# Patient Record
Sex: FEMALE | Race: WHITE | NOT HISPANIC OR LATINO | Employment: PART TIME | URBAN - METROPOLITAN AREA
[De-identification: names, ages, dates, MRNs, and addresses within clinical notes are randomized per-mention and may not be internally consistent; named-entity substitution may affect disease eponyms.]

---

## 2017-01-04 ENCOUNTER — TELEPHONE (OUTPATIENT)
Dept: FAMILY MEDICINE | Facility: CLINIC | Age: 37
End: 2017-01-04

## 2017-01-04 DIAGNOSIS — G47.00 INSOMNIA, UNSPECIFIED TYPE: Primary | ICD-10-CM

## 2017-01-04 RX ORDER — DOXEPIN HYDROCHLORIDE 10 MG/ML
3 SOLUTION ORAL AT BEDTIME
Qty: 30 ML | Refills: 3 | Status: SHIPPED | OUTPATIENT
Start: 2017-01-04 | End: 2018-05-03

## 2017-01-04 NOTE — TELEPHONE ENCOUNTER
Reason for Call:  Other / started new medication, doxepine, Dr Schumacher had wanted her to call in and give an update as to how the new medication is working    Detailed comments: Patient stated it is working and she would like to get a refill, Dr Schumacher had given patient a 1 month supply    Phone Number Patient can be reached at: Home number on file [phone] (home)    Best Time: anytime    Can we leave a detailed message on this number? YES    Call taken on 1/4/2017 at 10:17 AM by Althea Roche

## 2017-01-30 ENCOUNTER — MYC MEDICAL ADVICE (OUTPATIENT)
Dept: FAMILY MEDICINE | Facility: CLINIC | Age: 37
End: 2017-01-30

## 2017-01-30 NOTE — TELEPHONE ENCOUNTER
Se Tellez.     Ramya Rodriguez RN   January 30, 2017 12:39 PM  [facility]   [phone]

## 2018-01-03 ENCOUNTER — TELEPHONE (OUTPATIENT)
Dept: FAMILY MEDICINE | Facility: CLINIC | Age: 38
End: 2018-01-03

## 2018-01-03 DIAGNOSIS — Z00.00 ROUTINE GENERAL MEDICAL EXAMINATION AT A HEALTH CARE FACILITY: ICD-10-CM

## 2018-01-03 DIAGNOSIS — Z13.220 LIPID SCREENING: Primary | ICD-10-CM

## 2018-01-03 NOTE — TELEPHONE ENCOUNTER
Patient has a physical scheduled with you on 1/22/17.  Per Health Maintenance she is only due for Lipids.  She is requesting Thyroid and Vit. D labs. Her last tests were all normal. She is not taking levothyroxine and has not had a Vit D deficiency.  I ordered the tests because she had these labs done at her last physical also.  Left a message on patient's voicemail asking her to call and schedule a lab only appointment.  Regino Pierce RN

## 2018-01-03 NOTE — TELEPHONE ENCOUNTER
Reason for Call: Request for an order or referral:    Order or referral being requested: Full thyroid panel T3 and T4, Vitamin D, and all labs with annual physical.     Date needed: before my next appointment    Has the patient been seen by the PCP for this problem? Not Applicable    Additional comments: Patient would like to have her lab orders placed for her physical she has scheduled on 01/22/17. Please call back to discuss when labs are placed.     Phone number Patient can be reached at:  Home number on file [phone] (home)    Best Time:  anytime    Can we leave a detailed message on this number?  YES    Call taken on 1/3/2018 at 9:13 AM by Concha Henderson

## 2018-01-17 DIAGNOSIS — Z13.220 LIPID SCREENING: ICD-10-CM

## 2018-01-17 DIAGNOSIS — Z00.00 ROUTINE GENERAL MEDICAL EXAMINATION AT A HEALTH CARE FACILITY: ICD-10-CM

## 2018-01-17 PROCEDURE — 84443 ASSAY THYROID STIM HORMONE: CPT | Performed by: FAMILY MEDICINE

## 2018-01-17 PROCEDURE — 36415 COLL VENOUS BLD VENIPUNCTURE: CPT | Performed by: FAMILY MEDICINE

## 2018-01-17 PROCEDURE — 82306 VITAMIN D 25 HYDROXY: CPT | Performed by: FAMILY MEDICINE

## 2018-01-17 PROCEDURE — 80061 LIPID PANEL: CPT | Performed by: FAMILY MEDICINE

## 2018-01-18 LAB
CHOLEST SERPL-MCNC: 171 MG/DL
DEPRECATED CALCIDIOL+CALCIFEROL SERPL-MC: 28 UG/L (ref 20–75)
HDLC SERPL-MCNC: 47 MG/DL
LDLC SERPL CALC-MCNC: 114 MG/DL
NONHDLC SERPL-MCNC: 124 MG/DL
TRIGL SERPL-MCNC: 49 MG/DL
TSH SERPL DL<=0.005 MIU/L-ACNC: 0.73 MU/L (ref 0.4–4)

## 2018-01-22 ENCOUNTER — OFFICE VISIT (OUTPATIENT)
Dept: FAMILY MEDICINE | Facility: CLINIC | Age: 38
End: 2018-01-22
Payer: COMMERCIAL

## 2018-01-22 VITALS
RESPIRATION RATE: 22 BRPM | BODY MASS INDEX: 29.37 KG/M2 | HEART RATE: 90 BPM | HEIGHT: 64 IN | WEIGHT: 172 LBS | SYSTOLIC BLOOD PRESSURE: 132 MMHG | TEMPERATURE: 98.3 F | OXYGEN SATURATION: 97 % | DIASTOLIC BLOOD PRESSURE: 82 MMHG

## 2018-01-22 DIAGNOSIS — R53.83 OTHER FATIGUE: ICD-10-CM

## 2018-01-22 DIAGNOSIS — F51.04 PSYCHOPHYSIOLOGICAL INSOMNIA: ICD-10-CM

## 2018-01-22 DIAGNOSIS — F41.9 ANXIETY: ICD-10-CM

## 2018-01-22 DIAGNOSIS — Z12.4 CERVICAL CANCER SCREENING: ICD-10-CM

## 2018-01-22 DIAGNOSIS — Z30.41 SURVEILLANCE OF PREVIOUSLY PRESCRIBED CONTRACEPTIVE PILL: ICD-10-CM

## 2018-01-22 DIAGNOSIS — L40.9 PSORIASIS: ICD-10-CM

## 2018-01-22 DIAGNOSIS — Z00.01 ENCOUNTER FOR PREVENTATIVE ADULT HEALTH CARE EXAM WITH ABNORMAL FINDINGS: Primary | ICD-10-CM

## 2018-01-22 PROCEDURE — 99213 OFFICE O/P EST LOW 20 MIN: CPT | Mod: 25 | Performed by: FAMILY MEDICINE

## 2018-01-22 PROCEDURE — 99395 PREV VISIT EST AGE 18-39: CPT | Performed by: FAMILY MEDICINE

## 2018-01-22 PROCEDURE — G0145 SCR C/V CYTO,THINLAYER,RESCR: HCPCS | Performed by: FAMILY MEDICINE

## 2018-01-22 PROCEDURE — 87624 HPV HI-RISK TYP POOLED RSLT: CPT | Performed by: FAMILY MEDICINE

## 2018-01-22 RX ORDER — CLOBETASOL PROPIONATE 0.5 MG/ML
SOLUTION TOPICAL DAILY PRN
Qty: 50 ML | Refills: 3 | Status: SHIPPED | OUTPATIENT
Start: 2018-01-22 | End: 2021-10-25

## 2018-01-22 RX ORDER — ESCITALOPRAM OXALATE 10 MG/1
20 TABLET ORAL DAILY
Qty: 180 TABLET | Refills: 3 | Status: CANCELLED | OUTPATIENT
Start: 2018-01-22

## 2018-01-22 RX ORDER — LEVONORGESTREL AND ETHINYL ESTRADIOL 0.15-0.03
1 KIT ORAL DAILY
Qty: 90 TABLET | Refills: 4 | Status: SHIPPED | OUTPATIENT
Start: 2018-01-22 | End: 2019-03-26

## 2018-01-22 RX ORDER — ESCITALOPRAM OXALATE 20 MG/1
20 TABLET ORAL DAILY
Qty: 90 TABLET | Refills: 3 | Status: SHIPPED | OUTPATIENT
Start: 2018-01-22 | End: 2019-02-05

## 2018-01-22 ASSESSMENT — ANXIETY QUESTIONNAIRES
GAD7 TOTAL SCORE: 5
6. BECOMING EASILY ANNOYED OR IRRITABLE: MORE THAN HALF THE DAYS
1. FEELING NERVOUS, ANXIOUS, OR ON EDGE: MORE THAN HALF THE DAYS
2. NOT BEING ABLE TO STOP OR CONTROL WORRYING: NOT AT ALL
IF YOU CHECKED OFF ANY PROBLEMS ON THIS QUESTIONNAIRE, HOW DIFFICULT HAVE THESE PROBLEMS MADE IT FOR YOU TO DO YOUR WORK, TAKE CARE OF THINGS AT HOME, OR GET ALONG WITH OTHER PEOPLE: SOMEWHAT DIFFICULT
3. WORRYING TOO MUCH ABOUT DIFFERENT THINGS: NOT AT ALL
5. BEING SO RESTLESS THAT IT IS HARD TO SIT STILL: NOT AT ALL
7. FEELING AFRAID AS IF SOMETHING AWFUL MIGHT HAPPEN: NOT AT ALL

## 2018-01-22 ASSESSMENT — PATIENT HEALTH QUESTIONNAIRE - PHQ9
SUM OF ALL RESPONSES TO PHQ QUESTIONS 1-9: 8
5. POOR APPETITE OR OVEREATING: SEVERAL DAYS

## 2018-01-22 ASSESSMENT — PAIN SCALES - GENERAL: PAINLEVEL: NO PAIN (0)

## 2018-01-22 NOTE — MR AVS SNAPSHOT
After Visit Summary   1/22/2018    Josh Wolff    MRN: [id number]           Patient Information     Date Of Birth          1980        Visit Information        Provider Department      1/22/2018 4:20 PM Emili Schumacher MD [facility]        Today's Diagnoses     Encounter for preventative adult health care exam with abnormal findings    -  1    Anxiety        Other fatigue        Cervical cancer screening        Surveillance of previously prescribed contraceptive pill        Psychophysiological insomnia        Psoriasis          Care Instructions    [facility]   Discharged by : Theresa Conte CMA      If you have any questions regarding your visit please contact your care team:     Team Gold                Clinic Hours Telephone Number     Dr. Samantha Rodríguez 7am-7pm  Monday - Thursday   7am-5pm  Fridays  [phone]   (Appointment scheduling available 24/7)     RN Line  [phone] option 2     Urgent Care - [place] - 11am-9pm Monday-Friday Saturday-Sunday- 9am-5pm     [place] -   5pm-9pm Monday-Friday Saturday-Sunday- 9am-5pm    [phone] - [place]    [phone] - [place]       For a Price Quote for your services, please call our Consumer Price Line at [phone].     What options do I have for visits at the clinic other than the traditional office visit?     To expand how we care for you, many of our providers are utilizing electronic visits (e-visits) and telephone visits, when medically appropriate, for interactions with their patients rather than a visit in the clinic. We also offer nurse visits for many medical concerns. Just like any other service, we will bill your insurance company for this type of visit based on time spent on the phone with your provider. Not all insurance companies cover these visits.  Please check with your medical insurance if this type of visit is covered. You will be responsible for any charges that are not paid by your insurance.   E-visits via Meuugamehart: generally incur a $35.00 fee.     Telephone visits:   Time spent on the phone: *charged based on time that is spent on the phone in increments of 10 minutes. Estimated cost:   5-10 mins $30.00   11-20 mins. $59.00   21-30 mins. $85.00     Use Nativoo (secure email communication and access to your chart) to send your primary care provider a message or make an appointment. Ask someone on your Team how to sign up for Nativoo.     As always, Thank you for trusting us with your health care needs!      [facility]:    Scheduling Appointments  [place] [place]  Call: [phone]    Jelena Flower [facility]  Call: [phone]    [facility]  Call: [phone]    For Gastroenterology referrals   [facility]   Clinics and Surgery Center, 4th Floor   [address] [place] [address]   Appointments: [phone]    WHERE TO GO FOR CARE?  Clinic    Make an appointment if you:       Are sick (cold, cough, flu, sore throat, earache or in pain).       Have a small injury (sprain, small cut, burn or broken bone).       Need a physical exam, Pap smear, vaccine or prescription refill.       Have questions about your health or medicines.    To reach us:      Call [phone] ([phone]). Open 24 hours every day. (For counseling services, call [phone].)    Log into Nativoo at [email or web address]. (Visit [email or web address] to create an account.) Hospital emergency room    An emergency is a serious or life- threatening problem that must be treated right away.    Call 532 or get to the hospital if you have:      Very bad or sudden:            - Chest pain or pressure         - Bleeding         - Head or belly pain         - Dizziness or trouble seeing,  walking or                          Speaking      Problems breathing      Blood in your vomit or you are coughing up blood      A major injury (knocked out, loss of a finger or limb, rape, broken bone protruding from skin)    A mental health crisis. (Or call the Mental Health Crisis line at 1-275.762.8659 or Suicide Prevention Hotline at 1-403.940.9896.)    Open 24 hours every day. You don't need an appointment.     Urgent care    Visit urgent care for sickness or small injuries when the clinic is closed. You don't need an appointment. To check hours or find an urgent care near you, visit [email or web address]. Online care    Get [facility] from [facility] for more than 70 common problems, like colds, allergies and infections. Open 24 hours every day at:   [email or web address]   Need help deciding?    For advice about where to be seen, you may call your clinic and ask to speak with a nurse. We're here for you 24 hours every day.         If you are deaf or hard of hearing, please let us know. We provide many free services including sign language interpreters, oral interpreters, TTYs, telephone amplifiers, note takers and written materials.                    Follow-ups after your visit        Future tests that were ordered for you today     Open Future Orders        Priority Expected Expires Ordered    Ferritin Routine  1/22/2019 1/22/2018    T3, Free Routine  1/22/2019 1/22/2018    T4, free Routine  1/22/2019 1/22/2018    CBC with platelets differential Routine  1/22/2019 1/22/2018            Who to contact     If you have questions or need follow up information about today's clinic visit or your schedule please contact [facility] directly at [phone].  Normal or non-critical lab and imaging results will be communicated to you by MyChart, letter or phone within 4 business days after the clinic has received the results. If you do not hear from us within 7 days, please contact the clinic through [employer] or  "phone. If you have a critical or abnormal lab result, we will notify you by phone as soon as possible.  Submit refill requests through PureWRX or call your pharmacy and they will forward the refill request to us. Please allow 3 business days for your refill to be completed.          Additional Information About Your Visit        PostHelpershart Information     PureWRX gives you secure access to your electronic health record. If you see a primary care provider, you can also send messages to your care team and make appointments. If you have questions, please call your primary care clinic.  If you do not have a primary care provider, please call [phone] and they will assist you.        Care EveryWhere ID     This is your Care EveryWhere ID. This could be used by other organizations to access your [facility] medical records  [id number]        Your Vitals Were     Pulse Temperature Respirations Height Last Period Pulse Oximetry    90 98.3  F (36.8  C) (Oral) 22 5' 4.25\" (1.632 m) 01/16/2018 97%    BMI (Body Mass Index)                   29.29 kg/m2            Blood Pressure from Last 3 Encounters:   01/22/18 132/82   01/21/16 102/61   01/15/16 118/64    Weight from Last 3 Encounters:   01/22/18 172 lb (78 kg)   01/21/16 150 lb (68 kg)   01/15/16 150 lb (68 kg)              We Performed the Following     HPV High Risk Types DNA Cervical     Pap imaged thin layer screen with HPV - recommended age 30 - 65 years (select HPV order below)          Today's Medication Changes          These changes are accurate as of: 1/22/18  6:00 PM.  If you have any questions, ask your nurse or doctor.               These medicines have changed or have updated prescriptions.        Dose/Directions    * escitalopram 10 MG tablet   Commonly known as:  LEXAPRO   This may have changed:  Another medication with the same name was added. Make sure you understand how and when to take each.   Used for:  Anxiety   Changed by:  Emili Schumacher MD     "    Dose:  20 mg   Take 2 tablets (20 mg) by mouth daily   Quantity:  180 tablet   Refills:  3       * escitalopram 20 MG tablet   Commonly known as:  LEXAPRO   This may have changed:  You were already taking a medication with the same name, and this prescription was added. Make sure you understand how and when to take each.   Used for:  Anxiety   Changed by:  Emili Schumacher MD        Dose:  20 mg   Take 1 tablet (20 mg) by mouth daily   Quantity:  90 tablet   Refills:  3       levonorgestrel-ethinyl estradiol 0.15-30 MG-MCG per tablet   Commonly known as:  RUELETTE (28)   This may have changed:  additional instructions   Used for:  Surveillance of previously prescribed contraceptive pill   Changed by:  Emili Schumacher MD        Dose:  1 tablet   Take 1 tablet by mouth daily Continuous for 90 day cycle   Quantity:  90 tablet   Refills:  4       * Notice:  This list has 2 medication(s) that are the same as other medications prescribed for you. Read the directions carefully, and ask your doctor or other care provider to review them with you.         Where to get your medicines      These medications were sent to [facility] [address].  [address] [place] [address]     Phone:  [phone]     clobetasol 0.05 % external solution    escitalopram 20 MG tablet    levonorgestrel-ethinyl estradiol 0.15-30 MG-MCG per tablet                Primary Care Provider Office Phone # Fax #    Emili Schumacher [phone]       [address] [place] [address]        Equal Access to Services     DIOMEDES CUEVAS : Hadii elias craig hadasho Soomaali, waaxda luqadaha, qaybta kaalmada adeegyada, waxay siva roberts . So [facility] [phone].    ATENCIÓN: Si habla español, tiene a shah disposición servicios gratuitos de asistencia lingüística. Llame al [phone].    We comply with applicable federal civil rights laws and  [place] laws. We do not discriminate on the basis of race, color, national origin, age, disability, sex, sexual orientation, or gender identity.            Thank you!     Thank you for choosing [facility]  for your care. Our goal is always to provide you with excellent care. Hearing back from our patients is one way we can continue to improve our services. Please take a few minutes to complete the written survey that you may receive in the mail after your visit with us. Thank you!             Your Updated Medication List - Protect others around you: Learn how to safely use, store and throw away your medicines at www.disposemymeds.org.          This list is accurate as of: 1/22/18  6:00 PM.  Always use your most recent med list.                   Brand Name Dispense Instructions for use Diagnosis    CLARITIN PO      prn        * clobetasol 0.05 % ointment    TEMOVATE    90 g    Apply to psoriatic plaques twice a day as needed. Avoid face, groin, and armpits.  Do not use longer than 2 weeks at a time.Profile Rx: patient will contact pharmacy when needed    Psoriasis vulgaris       * clobetasol 0.05 % external solution    TEMOVATE    50 mL    Apply topically daily as needed For flares on scalp    Psoriasis       doxepin 10 MG/ML (HIGH CONC) solution    SINEquan    30 mL    Take 0.3 mLs (3 mg) by mouth At Bedtime    Insomnia, unspecified type       * escitalopram 10 MG tablet    LEXAPRO    180 tablet    Take 2 tablets (20 mg) by mouth daily    Anxiety       * escitalopram 20 MG tablet    LEXAPRO    90 tablet    Take 1 tablet (20 mg) by mouth daily    Anxiety       fluticasone 50 MCG/ACT spray    FLONASE     Spray 2 sprays into both nostrils as needed for rhinitis or allergies        levonorgestrel-ethinyl estradiol 0.15-30 MG-MCG per tablet    ROBERT (28)    90 tablet    Take 1 tablet by mouth daily Continuous for 90 day cycle    Surveillance of previously prescribed contraceptive pill        triamcinolone 0.1 % ointment    KENALOG    80 g    Use twice daily as needed to active areas of psoriasis.  Avoid face, groin, armpits, buttock crease, and skin folds. Profile Rx: patient will contact pharmacy when needed    Psoriasis vulgaris       TYLENOL PO      Take 500 mg by mouth every 6 hours as needed for mild pain or fever        * Notice:  This list has 4 medication(s) that are the same as other medications prescribed for you. Read the directions carefully, and ask your doctor or other care provider to review them with you.

## 2018-01-22 NOTE — PROGRESS NOTES
SUBJECTIVE:   CC: Josh Wolff is an 37 year old woman who presents for preventive health visit.     Physical   Annual:     Getting at least 3 servings of Calcium per day::  Yes    Bi-annual eye exam::  NO    Dental care twice a year::  Yes    Sleep apnea or symptoms of sleep apnea::  Daytime drowsiness    Diet::  Regular (no restrictions)    Frequency of exercise::  None    Taking medications regularly::  Yes    Medication side effects::  Not applicable    Additional concerns today::  YES            Periods:  Headaches prior to bleeding  3 days after starting pill pack gets a day of pelvic pain and spotting  Can get breakthrough bleeding if she doesn't take pill on exact time of day    Feels more regulated on SSRI.  Can tell when she is not mindful.  Therapy has been helpful.  On edge, irritable    Continues to feel tired.  Can nap easily.  This has continued to be bothersome    Wondering more about thyroid function, functional medicine.     In college, had overnight sleep test, trialed cpap didn't help, small amount of leg movement.    Can get some restless in evenings.  Feels like she can fall asleep easily. Never ready to get up in the morning.  Snoring-yes.    Tried the Whole 30-10 days       No stool changes          Anxiety Follow-Up    Status since last visit: Improved     Other associated symptoms:None    Complicating factors:   Significant life event: No   Current substance abuse: None  Depression symptoms: No  LELA-7 SCORE 9/28/2016 11/23/2016 1/22/2018   Total Score - - -   Total Score 8 10 5       GAD7                Today's PHQ-2 Score: PHQ-2 ( 1999 [employer]) 1/22/2018   Q1: Little interest or pleasure in doing things 1   Q2: Feeling down, depressed or hopeless 1   PHQ-2 Score 2   Q1: Little interest or pleasure in doing things Several days   Q2: Feeling down, depressed or hopeless Several days   PHQ-2 Score 2       Abuse: Current or Past(Physical, Sexual or Emotional)- NO  Do you feel safe in  your environment - YES    Social History   Substance Use Topics     Smoking status: Never Smoker     Smokeless tobacco: Never Used     Alcohol use No     Alcohol Use 2018   If you drink alcohol, do you typically have greater than 3 drinks per day OR greater than 7 drinks per week?   No   No flowsheet data found.      Reviewed orders with patient.  Reviewed health maintenance and updated orders accordingly - Yes  BP Readings from Last 3 Encounters:   18 132/82   16 102/61   01/15/16 118/64    Wt Readings from Last 3 Encounters:   18 172 lb (78 kg)   16 150 lb (68 kg)   01/15/16 150 lb (68 kg)                  Patient Active Problem List   Diagnosis     psoriasis     CARDIOVASCULAR SCREENING; LDL GOAL LESS THAN 160     History of shingles     S/P  section     Anxiety     Episodic tension-type headache, not intractable     Psychophysiological insomnia     Past Surgical History:   Procedure Laterality Date     C  DELIVERY ONLY  06     C I&D,THYROGLOSSAL CYST,INFECTED  childhood     C REPAIR CRUCIATE LIGAMENT,KNEE      LEFT     C/SECTION, LOW TRANSVERSE       , Low Transverse x 2      SECTION N/A 2014    Procedure:  SECTION;  Surgeon: Addis Infante MD;  Location: [facility] L[facility]D     DILATION AND CURETTAGE SUCTION  2013    Procedure: DILATION AND CURETTAGE SUCTION;  Suction Dilation and Curettage;  Surgeon: Addis Infante MD;  Location: [facility] OR     ENT SURGERY      Thyroglossal duct cyst       Social History   Substance Use Topics     Smoking status: Never Smoker     Smokeless tobacco: Never Used     Alcohol use No     Family History   Problem Relation Age of Onset     Hypertension Paternal Grandfather      DIABETES Maternal Grandmother      CANCER Mother      non-hodgkins lymphoma since      HEART DISEASE Paternal Grandfather      CANCER Maternal Grandmother      colon age 60's         Current Outpatient  Prescriptions   Medication Sig Dispense Refill     clobetasol (TEMOVATE) 0.05 % external solution Apply topically daily as needed For flares on scalp 50 mL 3     levonorgestrel-ethinyl estradiol (NORDETTE, 28,) 0.15-30 MG-MCG per tablet Take 1 tablet by mouth daily Continuous for 90 day cycle 90 tablet 4     escitalopram (LEXAPRO) 20 MG tablet Take 1 tablet (20 mg) by mouth daily 90 tablet 3     doxepin (SINEQUAN) 10 MG/ML (HIGH CONC) solution Take 0.3 mLs (3 mg) by mouth At Bedtime 30 mL 3     escitalopram (LEXAPRO) 10 MG tablet Take 2 tablets (20 mg) by mouth daily 180 tablet 3     triamcinolone (KENALOG) 0.1 % ointment Use twice daily as needed to active areas of psoriasis.  Avoid face, groin, armpits, buttock crease, and skin folds. Profile Rx: patient will contact pharmacy when needed 80 g 3     clobetasol (TEMOVATE) 0.05 % ointment Apply to psoriatic plaques twice a day as needed. Avoid face, groin, and armpits.  Do not use longer than 2 weeks at a time.Profile Rx: patient will contact pharmacy when needed 90 g 3     Acetaminophen (TYLENOL PO) Take 500 mg by mouth every 6 hours as needed for mild pain or fever       CLARITIN PO prn        fluticasone (FLONASE) 50 MCG/ACT nasal spray Spray 2 sprays into both nostrils as needed for rhinitis or allergies             Mammogram not appropriate for this patient based on age.    Pertinent mammograms are reviewed under the imaging tab.  History of abnormal Pap smear: NO - age 30- 65 PAP every 3 years recommended    Reviewed and updated as needed this visit by clinical staffTobacco  Allergies  Meds         Reviewed and updated as needed this visit by Provider            Review of Systems  C: NEGATIVE for fever, chills, change in weight  I: NEGATIVE for worrisome rashes, moles or lesions  E: NEGATIVE for vision changes or irritation  ENT: NEGATIVE for ear, mouth and throat problems  R: NEGATIVE for significant cough or SOB  B: NEGATIVE for masses, tenderness or  "discharge  CV: NEGATIVE for chest pain, palpitations or peripheral edema  GI: NEGATIVE for nausea, abdominal pain, heartburn, or change in bowel habits  M: NEGATIVE for significant arthralgias or myalgia  N: NEGATIVE for weakness, dizziness or paresthesias    See above     OBJECTIVE:   /82 (BP Location: Right arm, Patient Position: Chair, Cuff Size: Adult Regular)  Pulse 90  Temp 98.3  F (36.8  C) (Oral)  Resp 22  Ht 5' 4.25\" (1.632 m)  Wt 172 lb (78 kg)  LMP 01/16/2018  SpO2 97%  BMI 29.29 kg/m2  Physical Exam  GENERAL: healthy, alert and no distress  EYES: Eyes grossly normal to inspection, PERRL and conjunctivae and sclerae normal  HENT: ear canals and TM's normal, nose and mouth without ulcers or lesions  NECK: no adenopathy, no asymmetry, masses, or scars and thyroid normal to palpation  RESP: lungs clear to auscultation - no rales, rhonchi or wheezes  BREAST: normal without masses, tenderness or nipple discharge and no palpable axillary masses or adenopathy  CV: regular rate and rhythm, normal S1 S2, no S3 or S4, no murmur, click or rub, no peripheral edema and peripheral pulses strong  ABDOMEN: soft, nontender, no hepatosplenomegaly, no masses and bowel sounds normal   (female): normal female external genitalia, normal urethral meatus, vaginal mucosa pink, moist, well rugated, and normal cervix/adnexa/uterus without masses or discharge  MS: no gross musculoskeletal defects noted, no edema  SKIN: no suspicious lesions or rashes  NEURO: Normal strength and tone, mentation intact and speech normal  PSYCH: mentation appears normal, affect normal/bright    ASSESSMENT/PLAN:   1. Encounter for preventative adult health care exam with abnormal findings      2. Anxiety  She prefers to stay on current dose of SSRI  Has been to therapy and understands the tools she needs to use.  - escitalopram (LEXAPRO) 20 MG tablet; Take 1 tablet (20 mg) by mouth daily  Dispense: 90 tablet; Refill: 3  - OFFICE/OUTPT " "VISIT,EST,LEVL III    3. Other fatigue  This is chronic for over 10 years.  She is reading about functional thyroid disease and plans to send me a my chart with labs that she is interested in.  I've discussed that I don't always know what to do with the results when I get them back if they are essentially in normal range.  I've encouraged her to really think about decreasing sweets and/or carbs and changing her diet.  She agrees that she is \"addicted\" to sugar.  Doesn't do regular exercise due to busy life.  I believe lifestyle changes are key to helping her feel more energized.  Also talked about possibility of SSRI causing increase in fatigue.    - Ferritin; Future  - T3, Free; Future  - T4, free; Future  - CBC with platelets differential; Future  - OFFICE/OUTPT VISIT,SALAZAR SAGASTUME III    4. Cervical cancer screening    - Pap imaged thin layer screen with HPV - recommended age 30 - 65 years (select HPV order below)  - HPV High Risk Types DNA Cervical    5. Surveillance of previously prescribed contraceptive pill  She would like to go back to continuous cycle for 90 days with same pill.  If further bleeding irregularity, she should let us know   - levonorgestrel-ethinyl estradiol (ROBERT, 28,) 0.15-30 MG-MCG per tablet; Take 1 tablet by mouth daily Continuous for 90 day cycle  Dispense: 90 tablet; Refill: 4    6. Psychophysiological insomnia  Sleeping better since therapy, now trouble is mainly fatigue  - OFFICE/OUTPT VISIT,SALAZAR SAGASTUME III    7. Psoriasis  Chronic, stable, well controlled, continue current medication, refill done if needed    - clobetasol (TEMOVATE) 0.05 % external solution; Apply topically daily as needed For flares on scalp  Dispense: 50 mL; Refill: 3    COUNSELING:  Reviewed preventive health counseling, as reflected in patient instructions       Regular exercise       Healthy diet/nutrition       Contraception       Family planning    BP Screening:   Last 3 BP Readings:    BP Readings from Last 3 " "Encounters:   01/22/18 132/82   01/21/16 102/61   01/15/16 118/64       The following was recommended to the patient:  Re-screen BP within a year and recommended lifestyle modifications     reports that she has never smoked. She has never used smokeless tobacco.    Estimated body mass index is 29.29 kg/(m^2) as calculated from the following:    Height as of this encounter: 5' 4.25\" (1.632 m).    Weight as of this encounter: 172 lb (78 kg).   Weight management plan: Discussed healthy diet and exercise guidelines and patient will follow up in 12 months in clinic to re-evaluate.    Counseling Resources:  ATP IV Guidelines  Pooled Cohorts Equation Calculator  Breast Cancer Risk Calculator  FRAX Risk Assessment  ICSI Preventive Guidelines  Dietary Guidelines for Americans, 2010  USDA's MyPlate  ASA Prophylaxis  Lung CA Screening    Emili Schumacher MD  [facility]  Answers for HPI/ROS submitted by the patient on 1/22/2018   PHQ-2 Score: 2      "

## 2018-01-22 NOTE — NURSING NOTE
"Chief Complaint   Patient presents with     Physical     discuss birth control        Initial /82 (BP Location: Right arm, Patient Position: Chair, Cuff Size: Adult Regular)  Pulse 90  Temp 98.3  F (36.8  C) (Oral)  Resp 22  Ht 5' 4.25\" (1.632 m)  Wt 172 lb (78 kg)  LMP 01/16/2018  SpO2 97%  BMI 29.29 kg/m2 Estimated body mass index is 29.29 kg/(m^2) as calculated from the following:    Height as of this encounter: 5' 4.25\" (1.632 m).    Weight as of this encounter: 172 lb (78 kg).  Medication Reconciliation: complete   Theresa Conte CMA      "

## 2018-01-22 NOTE — PROGRESS NOTES
Immanuel Moreno,       Your lab results are normal.  We can talk further at your appointment.  Emili Schumacher MD

## 2018-01-22 NOTE — PATIENT INSTRUCTIONS
[facility]   Discharged by : Theresa Conte CMA      If you have any questions regarding your visit please contact your care team:     Team Gold                Clinic Hours Telephone Number     Dr. Samantha Rodríguez 7am-7pm  Monday - Thursday   7am-5pm  Fridays  [phone]   (Appointment scheduling available 24/7)     RN Line  [phone] option 2     [facility] [place] [facility] 11[facility]pm Monday-Friday Saturday-Sunday- 9am-5pm     [place] -   5pm-9pm Monday-Friday Saturday-Sunday- 9am-5pm    [phone] - [place]    [phone] - [place]       For a Price Quote for your services, please call our [employer] Price Line at [phone].     What options do I have for visits at the clinic other than the traditional office visit?     To expand how we care for you, many of our providers are utilizing electronic visits (e-visits) and telephone visits, when medically appropriate, for interactions with their patients rather than a visit in the clinic. We also offer nurse visits for many medical concerns. Just like any other service, we will bill your insurance company for this type of visit based on time spent on the phone with your provider. Not all insurance companies cover these visits. Please check with your medical insurance if this type of visit is covered. You will be responsible for any charges that are not paid by your insurance.   E-visits via [employer]: generally incur a $35.00 fee.     Telephone visits:   Time spent on the phone: *charged based on time that is spent on the phone in increments of 10 minutes. Estimated cost:   5-10 mins $30.00   11-20 mins. $59.00   21-30 mins. $85.00     Use [employer] (secure email communication and access to your chart) to send your primary care provider a message or make an appointment. Ask someone on your Team how to sign up for [employer].      As always, Thank you for trusting us with your health care needs!      [facility]:    Scheduling Appointments  [facility]  Call: [phone]    [facility]  Call: [phone]    [facility]  Call: [phone]    For Gastroenterology referrals   [facility]   Clinics and Surgery Center, 4th Floor   [address] [place] [address]   Appointments: [phone]    WHERE TO GO FOR CARE?  Clinic    Make an appointment if you:       Are sick (cold, cough, flu, sore throat, earache or in pain).       Have a small injury (sprain, small cut, burn or broken bone).       Need a physical exam, Pap smear, vaccine or prescription refill.       Have questions about your health or medicines.    To reach us:      Call [phone] ([phone]). Open 24 hours every day. (For counseling services, call [phone].)    Log into [employer]. (Visit [email or web address] to create an account.) Hospital emergency room    An emergency is a serious or life- threatening problem that must be treated right away.    Call 490 or get to the hospital if you have:      Very bad or sudden:            - Chest pain or pressure         - Bleeding         - Head or belly pain         - Dizziness or trouble seeing, walking or                          Speaking      Problems breathing      Blood in your vomit or you are coughing up blood      A major injury (knocked out, loss of a finger or limb, rape, broken bone protruding from skin)    A mental health crisis. (Or call the Mental Health Crisis line at 1-829.161.4215 or Suicide Prevention Hotline at 1-590.529.8831.)    Open 24 hours every day. You don't need an appointment.     Urgent care    Visit urgent care for sickness or small injuries when the clinic is closed. You don't need an appointment. To check hours or find an urgent care near you, visit  [email or web address]. Online care    Get online care from [facility] for more than 70 common problems, like colds, allergies and infections. Open 24 hours every day at:   [email or web address]   Need help deciding?    For advice about where to be seen, you may call your clinic and ask to speak with a nurse. We're here for you 24 hours every day.         If you are deaf or hard of hearing, please let us know. We provide many free services including sign language interpreters, oral interpreters, TTYs, telephone amplifiers, note takers and written materials.

## 2018-01-23 ASSESSMENT — ANXIETY QUESTIONNAIRES: GAD7 TOTAL SCORE: 5

## 2018-01-25 LAB
COPATH REPORT: NORMAL
PAP: NORMAL

## 2018-01-26 LAB
FINAL DIAGNOSIS: NORMAL
HPV HR 12 DNA CVX QL NAA+PROBE: NEGATIVE
HPV16 DNA SPEC QL NAA+PROBE: NEGATIVE
HPV18 DNA SPEC QL NAA+PROBE: NEGATIVE
SPECIMEN DESCRIPTION: NORMAL
SPECIMEN SOURCE CVX/VAG CYTO: NORMAL

## 2018-05-03 DIAGNOSIS — G47.00 INSOMNIA, UNSPECIFIED TYPE: ICD-10-CM

## 2018-05-03 RX ORDER — DOXEPIN HYDROCHLORIDE 10 MG/ML
SOLUTION ORAL
Qty: 30 ML | Refills: 1 | Status: SHIPPED | OUTPATIENT
Start: 2018-05-03 | End: 2019-02-05

## 2018-05-03 NOTE — TELEPHONE ENCOUNTER
"Requested Prescriptions   Pending Prescriptions Disp Refills     doxepin (SINEQUAN) 10 MG/ML (HIGH CONC) solution [Pharmacy Med Name: DOXEPIN HCL 10MG/ML CONC]  Last Written Prescription Date:  1/4/2017  Last Fill Quantity: 30 mL,  # refills: 3   Last office visit: 1/22/2018 with prescribing provider:  SEYMOUR Schumacher   Future Office Visit:     30 mL 3     Sig: TAKE 0.3ML BY MOUTH AT BEDTIME    Tricyclic Antidepressants Protocol Passed    5/3/2018  1:20 PM       Passed - Blood pressure under 140/90 in past 12 months    BP Readings from Last 3 Encounters:   01/22/18 132/82   01/21/16 102/61   01/15/16 118/64          Passed - Recent (12 mo) or future (30 days) visit within the authorizing provider's specialty     Patient had office visit in the last 12 months or has a visit in the next 30 days with authorizing provider or within the authorizing provider's specialty.  See \"Patient Info\" tab in inbasket, or \"Choose Columns\" in Meds & Orders section of the refill encounter.           Passed - Patient is age 18 or older       Passed - No active pregnancy on record       Passed - No positive pregnancy test in past 12 months          "

## 2018-05-03 NOTE — TELEPHONE ENCOUNTER
Prescription approved per [facility] Refill Protocol.  Dionna García RN

## 2018-12-19 ENCOUNTER — NURSE TRIAGE (OUTPATIENT)
Dept: NURSING | Facility: CLINIC | Age: 38
End: 2018-12-19

## 2018-12-19 NOTE — TELEPHONE ENCOUNTER
Josh is at [facility] ED and got a heparin drip in eye.  Called through MSDS line.  FNA advised to was eyes with water flush for 20 minutes go to Ed immediately and contact supervisor and to fill out Icare report and Josh agreed.      Reason for Disposition    [1] Eye pain AND [2] persists > 1 hour since irrigation    Additional Information    Negative: Acid or alkali was the chemical  (Exceptions: mild household bleach or ammonia)    Negative: [1] Unknown chemical AND [2] any eye symptoms (e.g., pain)    Negative: [1] Harmful or possibly harmful chemical AND [2] unable to carry out irrigation (at home, work)    Negative: Shortness of breath    Negative: Cloudy spot or sore on the cornea (clear central part of eye)    Negative: [1] Blurred vision AND [2] persists > 1 hour since irrigation    Protocols used: EYE - CHEMICAL IN-ADULT-

## 2019-02-05 ENCOUNTER — OFFICE VISIT (OUTPATIENT)
Dept: FAMILY MEDICINE | Facility: CLINIC | Age: 39
End: 2019-02-05
Payer: COMMERCIAL

## 2019-02-05 VITALS
TEMPERATURE: 98.6 F | WEIGHT: 171 LBS | BODY MASS INDEX: 29.19 KG/M2 | HEIGHT: 64 IN | SYSTOLIC BLOOD PRESSURE: 114 MMHG | DIASTOLIC BLOOD PRESSURE: 68 MMHG | HEART RATE: 66 BPM

## 2019-02-05 DIAGNOSIS — Z30.41 SURVEILLANCE OF PREVIOUSLY PRESCRIBED CONTRACEPTIVE PILL: ICD-10-CM

## 2019-02-05 DIAGNOSIS — Z00.00 ROUTINE HISTORY AND PHYSICAL EXAMINATION OF ADULT: Primary | ICD-10-CM

## 2019-02-05 DIAGNOSIS — F41.9 ANXIETY: ICD-10-CM

## 2019-02-05 DIAGNOSIS — L40.0 PSORIASIS VULGARIS: ICD-10-CM

## 2019-02-05 DIAGNOSIS — G47.00 INSOMNIA, UNSPECIFIED TYPE: ICD-10-CM

## 2019-02-05 LAB
ALBUMIN SERPL-MCNC: 4.1 G/DL (ref 3.4–5)
ALP SERPL-CCNC: 74 U/L (ref 40–150)
ALT SERPL W P-5'-P-CCNC: 22 U/L (ref 0–50)
ANION GAP SERPL CALCULATED.3IONS-SCNC: 4 MMOL/L (ref 3–14)
AST SERPL W P-5'-P-CCNC: 14 U/L (ref 0–45)
BILIRUB SERPL-MCNC: 0.5 MG/DL (ref 0.2–1.3)
BUN SERPL-MCNC: 10 MG/DL (ref 7–30)
CALCIUM SERPL-MCNC: 8.9 MG/DL (ref 8.5–10.1)
CHLORIDE SERPL-SCNC: 109 MMOL/L (ref 94–109)
CHOLEST SERPL-MCNC: 152 MG/DL
CO2 SERPL-SCNC: 28 MMOL/L (ref 20–32)
CREAT SERPL-MCNC: 0.86 MG/DL (ref 0.52–1.04)
GFR SERPL CREATININE-BSD FRML MDRD: 86 ML/MIN/{1.73_M2}
GLUCOSE BLD-MCNC: 100 MG/DL (ref 70–99)
GLUCOSE SERPL-MCNC: 98 MG/DL (ref 70–99)
HDLC SERPL-MCNC: 46 MG/DL
LDLC SERPL CALC-MCNC: 95 MG/DL
NONHDLC SERPL-MCNC: 106 MG/DL
POTASSIUM SERPL-SCNC: 4.6 MMOL/L (ref 3.4–5.3)
PROT SERPL-MCNC: 6.8 G/DL (ref 6.8–8.8)
SODIUM SERPL-SCNC: 141 MMOL/L (ref 133–144)
TRIGL SERPL-MCNC: 55 MG/DL

## 2019-02-05 PROCEDURE — 80061 LIPID PANEL: CPT | Performed by: NURSE PRACTITIONER

## 2019-02-05 PROCEDURE — 36415 COLL VENOUS BLD VENIPUNCTURE: CPT | Performed by: NURSE PRACTITIONER

## 2019-02-05 PROCEDURE — 99395 PREV VISIT EST AGE 18-39: CPT | Performed by: NURSE PRACTITIONER

## 2019-02-05 PROCEDURE — 82947 ASSAY GLUCOSE BLOOD QUANT: CPT | Performed by: NURSE PRACTITIONER

## 2019-02-05 PROCEDURE — 80053 COMPREHEN METABOLIC PANEL: CPT | Mod: 59 | Performed by: NURSE PRACTITIONER

## 2019-02-05 PROCEDURE — 99214 OFFICE O/P EST MOD 30 MIN: CPT | Mod: 25 | Performed by: NURSE PRACTITIONER

## 2019-02-05 RX ORDER — CLOBETASOL PROPIONATE 0.5 MG/G
OINTMENT TOPICAL
Qty: 90 G | Refills: 3 | Status: SHIPPED | OUTPATIENT
Start: 2019-02-05 | End: 2022-05-15

## 2019-02-05 RX ORDER — ESCITALOPRAM OXALATE 10 MG/1
20 TABLET ORAL DAILY
Qty: 180 TABLET | Refills: 3 | Status: CANCELLED | OUTPATIENT
Start: 2019-02-05

## 2019-02-05 RX ORDER — ESCITALOPRAM OXALATE 20 MG/1
20 TABLET ORAL DAILY
Qty: 90 TABLET | Refills: 3 | Status: SHIPPED | OUTPATIENT
Start: 2019-02-05 | End: 2020-03-03

## 2019-02-05 RX ORDER — LEVONORGESTREL AND ETHINYL ESTRADIOL 0.15-0.03
1 KIT ORAL DAILY
Qty: 90 TABLET | Refills: 4 | Status: CANCELLED | OUTPATIENT
Start: 2019-02-05

## 2019-02-05 RX ORDER — TRAZODONE HYDROCHLORIDE 50 MG/1
50 TABLET, FILM COATED ORAL AT BEDTIME
Qty: 90 TABLET | Refills: 3 | Status: SHIPPED | OUTPATIENT
Start: 2019-02-05 | End: 2021-07-06

## 2019-02-05 RX ORDER — DOXEPIN HYDROCHLORIDE 10 MG/ML
SOLUTION ORAL
Qty: 30 ML | Refills: 1 | Status: CANCELLED | OUTPATIENT
Start: 2019-02-05

## 2019-02-05 ASSESSMENT — ENCOUNTER SYMPTOMS
DIARRHEA: 0
PARESTHESIAS: 0
NAUSEA: 0
EYE PAIN: 0
NERVOUS/ANXIOUS: 0
SORE THROAT: 0
HEADACHES: 0
HEMATOCHEZIA: 0
COUGH: 0
CHILLS: 0
HEMATURIA: 0
CONSTIPATION: 0
SHORTNESS OF BREATH: 0
SLEEP DISTURBANCE: 1
ARTHRALGIAS: 0
MYALGIAS: 0
FEVER: 0
BREAST MASS: 0
JOINT SWELLING: 0
FREQUENCY: 0
DIZZINESS: 0
DYSURIA: 0
ABDOMINAL PAIN: 0
HEARTBURN: 0
PALPITATIONS: 0
WEAKNESS: 0

## 2019-02-05 ASSESSMENT — ANXIETY QUESTIONNAIRES
IF YOU CHECKED OFF ANY PROBLEMS ON THIS QUESTIONNAIRE, HOW DIFFICULT HAVE THESE PROBLEMS MADE IT FOR YOU TO DO YOUR WORK, TAKE CARE OF THINGS AT HOME, OR GET ALONG WITH OTHER PEOPLE: SOMEWHAT DIFFICULT
GAD7 TOTAL SCORE: 3
7. FEELING AFRAID AS IF SOMETHING AWFUL MIGHT HAPPEN: NOT AT ALL
6. BECOMING EASILY ANNOYED OR IRRITABLE: SEVERAL DAYS
3. WORRYING TOO MUCH ABOUT DIFFERENT THINGS: NOT AT ALL
1. FEELING NERVOUS, ANXIOUS, OR ON EDGE: SEVERAL DAYS
5. BEING SO RESTLESS THAT IT IS HARD TO SIT STILL: NOT AT ALL
2. NOT BEING ABLE TO STOP OR CONTROL WORRYING: NOT AT ALL

## 2019-02-05 ASSESSMENT — PATIENT HEALTH QUESTIONNAIRE - PHQ9: 5. POOR APPETITE OR OVEREATING: SEVERAL DAYS

## 2019-02-05 ASSESSMENT — MIFFLIN-ST. JEOR: SCORE: 1449.62

## 2019-02-05 NOTE — PROGRESS NOTES
"   SUBJECTIVE:   CC: Josh Wolff is an 38 year old woman who presents for preventive health visit.     Physical   Annual:     Getting at least 3 servings of Calcium per day:  Yes    Bi-annual eye exam:  NO    Dental care twice a year:  Yes    Sleep apnea or symptoms of sleep apnea:  None    Diet:  Regular (no restrictions)    Frequency of exercise:  2-3 days/week    Duration of exercise:  30-45 minutes    Taking medications regularly:  Yes    Medication side effects:  None    Additional concerns today:  No    Anxiety Follow-Up    Status since last visit: up and down. Still feels that she is anxious when she gets up in the morning. Has stopped taking Doxepin. Wanted to see what it would be liek to be off medication. Has been worse. Wonders if there is another medication to help her?    Other associated symptoms:None    Complicating factors:   Significant life event: No   Current substance abuse: None  Depression symptoms: No  LELA-7 SCORE 9/28/2016 11/23/2016 1/22/2018   Total Score - - -   Total Score 8 10 5     Says that her  googled the birth control pill and found that it could possibly worsen anxiety.  Patient stopped her medication she did not notice any effect on her anxiety.  She does not want to return on the birth control at this time she wants to give her body a chance to \"get back to normal\".   LELA-7    Altered sleep  Says that she is had sleep issues for years.  She was on doxepin for her sleep.  This seemed to help.  She went off of this to see if she could sleep on her own without medications and it has worsened.  She would like to know if there is other medication available to help her with sleep?  Discussed sleep hygiene.  She watches TV before going to bed.  Discussed good sleep routine.  Today's PHQ-2 Score:   PHQ-2 ( 1999 Pf[employer]) 1/22/2018   Q1: Little interest or pleasure in doing things 1   Q2: Feeling down, depressed or hopeless 1   PHQ-2 Score 2   Q1: Little interest or pleasure in " "doing things Several days   Q2: Feeling down, depressed or hopeless Several days   PHQ-2 Score 2     Weight-  Above goal. Says that she's gained 20 lbs over the past 2 years. Says that she eats because she is anxious.   Has just returned to gym. 3 days week. Discussed diet.     Estimated body mass index is 29.12 kg/m  as calculated from the following:    Height as of this encounter: 1.632 m (5' 4.25\").    Weight as of this encounter: 77.6 kg (171 lb).    Complete \"Weight Managment Plan\" in the progress note from the Adult Preventative or Medicare smartsets, use phrase .WEIGHTPLAN, or choose an option from Weight Management Resources smartset below.    Abuse: Current or Past(Physical, Sexual or Emotional)- No  Do you feel safe in your environment? Yes    Social History     Tobacco Use     Smoking status: Never Smoker     Smokeless tobacco: Never Used   Substance Use Topics     Alcohol use: No     No flowsheet data found.No flowsheet data found.    Reviewed orders with patient.  Reviewed health maintenance and updated orders accordingly - Yes  Labs reviewed in EPIC    Mammogram not appropriate for this patient based on age.    Pertinent mammograms are reviewed under the imaging tab.  History of abnormal Pap smear: NO - age 30- 65 PAP every 3 years recommended  PAP / HPV Latest Ref Rng & Units 1/22/2018 1/6/2015 12/7/2011   PAP - NIL NIL NIL   HPV 16 DNA NEG:Negative Negative - -   HPV 18 DNA NEG:Negative Negative - -   OTHER HR HPV NEG:Negative Negative - -     Reviewed and updated as needed this visit by clinical staff         Reviewed and updated as needed this visit by Provider            Review of Systems   Constitutional: Negative for chills and fever.   HENT: Negative for congestion, ear pain, hearing loss and sore throat.    Eyes: Negative for pain and visual disturbance.   Respiratory: Negative for cough and shortness of breath.    Cardiovascular: Negative for chest pain, palpitations and peripheral edema. "   Gastrointestinal: Negative for abdominal pain, constipation, diarrhea, heartburn, hematochezia and nausea.   Breasts:  Negative for breast mass and discharge.   Genitourinary: Negative for dysuria, frequency, genital sores, hematuria, pelvic pain, urgency, vaginal bleeding and vaginal discharge.   Musculoskeletal: Negative for arthralgias, joint swelling and myalgias.   Skin: Negative for rash.   Neurological: Negative for dizziness, weakness, headaches and paresthesias.   Psychiatric/Behavioral: Positive for mood changes and sleep disturbance. The patient is not nervous/anxious.           OBJECTIVE:   There were no vitals taken for this visit.  Physical Exam  GENERAL: healthy, alert and no distress  EYES: Eyes grossly normal to inspection, PERRL and conjunctivae and sclerae normal  HENT: ear canals and TM's normal, nose and mouth without ulcers or lesions  NECK: no adenopathy, no asymmetry, masses, or scars and thyroid normal to palpation  RESP: lungs clear to auscultation - no rales, rhonchi or wheezes  CV: regular rate and rhythm, normal S1 S2, no S3 or S4, no murmur, click or rub, no peripheral edema and peripheral pulses strong  ABDOMEN: soft, nontender, no hepatosplenomegaly, no masses and bowel sounds normal  MS: no gross musculoskeletal defects noted, no edema  SKIN: no suspicious lesions or rashes  NEURO: Normal strength and tone, mentation intact and speech normal  PSYCH: mentation appears normal, affect normal/bright    Diagnostic Test Results:  No results found for this or any previous visit (from the past 24 hour(s)).    ASSESSMENT/PLAN:   1. Routine history and physical examination of adult  WNL with Chronic issues addressed   - Glucose, whole blood  - Lipid panel reflex to direct LDL Fasting; Future  - Lipid panel reflex to direct LDL Fasting    2. Anxiety  Up and down with altered sleep.   For now she will remain on lexapro and have her sleep medication adjusted. F/u in 6 weeks, if anxiety is  "uncontrolled recommend switching to another serotonin specific reuptake inhibitor.   - escitalopram (LEXAPRO) 20 MG tablet; Take 1 tablet (20 mg) by mouth daily  Dispense: 90 tablet; Refill: 3  - Comprehensive metabolic panel (BMP + Alb, Alk Phos, ALT, AST, Total. Bili, TP)    3. Insomnia, unspecified type  Worsened.  Discontinue doxepin and start trazodone follow-up in 6-weeks.   - traZODone (DESYREL) 50 MG tablet; Take 1 tablet (50 mg) by mouth At Bedtime  Dispense: 90 tablet; Refill: 3    4. Surveillance of previously prescribed contraceptive pill  She is off birth control.  Counseling provided she does not want to go on anything at this time.    5. Psoriasis vulgaris  Stable and refilled  - clobetasol (TEMOVATE) 0.05 % external ointment; Apply to psoriatic plaques twice a day as needed. Avoid face, groin, and armpits.  Do not use longer than 2 weeks at a time.Profile Rx: patient will contact pharmacy when needed  Dispense: 90 g; Refill: 3    COUNSELING:  Reviewed preventive health counseling, as reflected in patient instructions       Regular exercise       Healthy diet/nutrition       Majority of encounter was discussing her anxiety and sleep disturbances.    BP Readings from Last 1 Encounters:   01/22/18 132/82     Estimated body mass index is 29.29 kg/m  as calculated from the following:    Height as of 1/22/18: 1.632 m (5' 4.25\").    Weight as of 1/22/18: 78 kg (172 lb).    BP Screening:   Last 3 BP Readings:    BP Readings from Last 3 Encounters:   02/05/19 114/68   01/22/18 132/82   01/21/16 102/61       The following was recommended to the patient:  Re-screen BP within a year and recommended lifestyle modifications  Weight management plan: Discussed healthy diet and exercise guidelines Also recommended eating frequent small meals a day.     reports that  has never smoked. she has never used smokeless tobacco.      Counseling Resources:  ATP IV Guidelines  Pooled Cohorts Equation Calculator  Breast Cancer " Risk Calculator  FRAX Risk Assessment  ICSI Preventive Guidelines  Dietary Guidelines for Americans, 2010  USDA's MyPlate  ASA Prophylaxis  Lung CA Screening    JOSELITO Vasquez CNP  [facility]

## 2019-02-05 NOTE — PATIENT INSTRUCTIONS
Preventive Health Recommendations  Female Ages 26 - 39  Yearly exam:   See your health care provider every year in order to    Review health changes.     Discuss preventive care.      Review your medicines if you your doctor has prescribed any.    Until age 30: Get a Pap test every three years (more often if you have had an abnormal result).    After age 30: Talk to your doctor about whether you should have a Pap test every 3 years or have a Pap test with HPV screening every 5 years.   You do not need a Pap test if your uterus was removed (hysterectomy) and you have not had cancer.  You should be tested each year for STDs (sexually transmitted diseases), if you're at risk.   Talk to your provider about how often to have your cholesterol checked.  If you are at risk for diabetes, you should have a diabetes test (fasting glucose).  Shots: Get a flu shot each year. Get a tetanus shot every 10 years.   Nutrition:     Eat at least 5 servings of fruits and vegetables each day.    Eat whole-grain bread, whole-wheat pasta and brown rice instead of white grains and rice.    Get adequate Calcium and Vitamin D.     Lifestyle    Exercise at least 150 minutes a week (30 minutes a day, 5 days of the week). This will help you control your weight and prevent disease.    Limit alcohol to one drink per day.    No smoking.     Wear sunscreen to prevent skin cancer.    See your dentist every six months for an exam and cleaning.    [facility]   Discharged by : Meliza Saldivar MA    Paper scripts provided to patient : yes     If you have any questions regarding your visit please contact your care team:     Team Gold                Clinic Hours Telephone Number     Dr. Samantha Patel CNP 7am-7pm  Monday - Thursday   7am-5pm  Fridays  [phone]   (Appointment scheduling available 24/7)     RN Line  [phone] option 2     Urgent Care - [place]  and [place] - 11am-9pm Monday-Friday Saturday-Sunday- 9am-5pm     [place] -   5pm-9pm Monday-Friday Saturday-Sunday- 9am-5pm    [phone] - [place]    [phone] - [place]     For a Price Quote for your services, please call our Consumer Price Line at [phone].     What options do I have for visits at the clinic other than the traditional office visit?     To expand how we care for you, many of our providers are utilizing electronic visits (e-visits) and telephone visits, when medically appropriate, for interactions with their patients rather than a visit in the clinic. We also offer nurse visits for many medical concerns. Just like any other service, we will bill your insurance company for this type of visit based on time spent on the phone with your provider. Not all insurance companies cover these visits. Please check with your medical insurance if this type of visit is covered. You will be responsible for any charges that are not paid by your insurance.   E-visits via [employer]: generally incur a $35.00 fee.     Telephone visits:  Time spent on the phone: *charged based on time that is spent on the phone in increments of 10 minutes. Estimated cost:   5-10 mins $30.00   11-20 mins. $59.00   21-30 mins. $85.00     Use [email or web address] (secure email communication and access to your chart) to send your primary care provider a message or make an appointment. Ask someone on your Team how to sign up for [employer].     As always, Thank you for trusting us with your health care needs!    [facility]:    Scheduling Appointments  Jazlyn Oneal [facility]  Call: [phone]    [place], [facility]  Call: [phone]    [facility]  Call: [phone]    For Gastroenterology referrals   [facility] Gastroenterology   Clinics and Surgery Center, 4th Floor   [address] [place] [address]   Appointments: [phone]    WHERE  TO GO FOR CARE?  Clinic    Make an appointment if you:       Are sick (cold, cough, flu, sore throat, earache or in pain).       Have a small injury (sprain, small cut, burn or broken bone).       Need a physical exam, Pap smear, vaccine or prescription refill.       Have questions about your health or medicines.    To reach us:      Call [phone] ([phone]). Open 24 hours every day. (For counseling services, call [phone].)    Log into [email or web address] at RatherGather. (Visit DrawQuest to create an account.) Hospital emergency room    An emergency is a serious or life- threatening problem that must be treated right away.    Call 258 or get to the hospital if you have:      Very bad or sudden:            - Chest pain or pressure         - Bleeding         - Head or belly pain         - Dizziness or trouble seeing, walking or                          Speaking      Problems breathing      Blood in your vomit or you are coughing up blood      A major injury (knocked out, loss of a finger or limb, rape, broken bone protruding from skin)    A mental health crisis. (Or call the Mental Health Crisis line at 1-556.351.2867 or Suicide Prevention Hotline at 1-583.188.8516.)    Open 24 hours every day. You don't need an appointment.     Urgent care    Visit urgent care for sickness or small injuries when the clinic is closed. You don't need an appointment. To check hours or find an urgent care near you, visit [email or web address]. Online care    Get online care from [facility] for more than 70 common problems, like colds, allergies and infections. Open 24 hours every day at:   [email or web address]   Need help deciding?    For advice about where to be seen, you may call your clinic and ask to speak with a nurse. We're here for you 24 hours every day.         If you are deaf or hard of hearing, please let us know. We provide many free services including sign language interpreters, oral interpreters, TTYs, telephone  amplifiers, note takers and written materials.

## 2019-02-06 ASSESSMENT — ANXIETY QUESTIONNAIRES: GAD7 TOTAL SCORE: 3

## 2019-03-26 ENCOUNTER — OFFICE VISIT (OUTPATIENT)
Dept: FAMILY MEDICINE | Facility: CLINIC | Age: 39
End: 2019-03-26
Payer: COMMERCIAL

## 2019-03-26 VITALS
HEART RATE: 70 BPM | WEIGHT: 173 LBS | BODY MASS INDEX: 29.53 KG/M2 | SYSTOLIC BLOOD PRESSURE: 116 MMHG | TEMPERATURE: 98.9 F | HEIGHT: 64 IN | DIASTOLIC BLOOD PRESSURE: 68 MMHG

## 2019-03-26 DIAGNOSIS — F41.1 GAD (GENERALIZED ANXIETY DISORDER): Primary | ICD-10-CM

## 2019-03-26 PROCEDURE — 99214 OFFICE O/P EST MOD 30 MIN: CPT | Performed by: NURSE PRACTITIONER

## 2019-03-26 RX ORDER — BUPROPION HYDROCHLORIDE 150 MG/1
150 TABLET ORAL EVERY MORNING
Qty: 60 TABLET | Refills: 1 | Status: SHIPPED | OUTPATIENT
Start: 2019-03-26 | End: 2020-06-01 | Stop reason: DRUGHIGH

## 2019-03-26 ASSESSMENT — ANXIETY QUESTIONNAIRES
GAD7 TOTAL SCORE: 4
3. WORRYING TOO MUCH ABOUT DIFFERENT THINGS: NOT AT ALL
2. NOT BEING ABLE TO STOP OR CONTROL WORRYING: NOT AT ALL
5. BEING SO RESTLESS THAT IT IS HARD TO SIT STILL: NOT AT ALL
1. FEELING NERVOUS, ANXIOUS, OR ON EDGE: SEVERAL DAYS
6. BECOMING EASILY ANNOYED OR IRRITABLE: MORE THAN HALF THE DAYS
7. FEELING AFRAID AS IF SOMETHING AWFUL MIGHT HAPPEN: NOT AT ALL
IF YOU CHECKED OFF ANY PROBLEMS ON THIS QUESTIONNAIRE, HOW DIFFICULT HAVE THESE PROBLEMS MADE IT FOR YOU TO DO YOUR WORK, TAKE CARE OF THINGS AT HOME, OR GET ALONG WITH OTHER PEOPLE: SOMEWHAT DIFFICULT

## 2019-03-26 ASSESSMENT — MIFFLIN-ST. JEOR: SCORE: 1458.69

## 2019-03-26 ASSESSMENT — PATIENT HEALTH QUESTIONNAIRE - PHQ9: 5. POOR APPETITE OR OVEREATING: SEVERAL DAYS

## 2019-03-26 NOTE — PATIENT INSTRUCTIONS
Take 4000 units in the AM    Start wellbutrin 150 mg daily     F/U in 4 weeks    Ok to increase Trazodone 75 mg HS     [facility]   Discharged by : Meliza Saldivar MA  Paper scripts provided to patient : no     If you have any questions regarding your visit please contact your care team:     Team Gold                Clinic Hours Telephone Number     Dr. Samantha Patel, CNP 7am-7pm  Monday - Thursday   7am-5pm  Fridays  [phone]   (Appointment scheduling available 24/7)     RN Line  [phone] option 2     [facility] [place] [facility] 11am-9pm Monday-Friday Saturday-Sunday- 9am-5pm     [place] -   5pm-9pm Monday-Friday Saturday-Sunday- 9am-5pm    [phone] - [place]lyn [place]    [phone] - [place]     For a Price Quote for your services, please call our Consumer Price Line at [phone].     What options do I have for visits at the clinic other than the traditional office visit?     To expand how we care for you, many of our providers are utilizing electronic visits (e-visits) and telephone visits, when medically appropriate, for interactions with their patients rather than a visit in the clinic. We also offer nurse visits for many medical concerns. Just like any other service, we will bill your insurance company for this type of visit based on time spent on the phone with your provider. Not all insurance companies cover these visits. Please check with your medical insurance if this type of visit is covered. You will be responsible for any charges that are not paid by your insurance.   E-visits via [employer]: generally incur a $45.00 fee.     Telephone visits:  Time spent on the phone: *charged based on time that is spent on the phone in increments of 10 minutes. Estimated cost:   5-10 mins $30.00   11-20 mins. $59.00   21-30 mins. $85.00     Use [employer] (secure email communication and access to your chart) to send your  primary care provider a message or make an appointment. Ask someone on your Team how to sign up for Pharmaron Holding.     As always, Thank you for trusting us with your health care needs!    [facility]:    Scheduling Appointments  [place] [place]  Call: [phone]    Jelena Flower [facility]  Call: [phone]    [facility]  Call: [phone]    For Gastroenterology referrals   [facility] Gastroenterology   Clinics and Surgery [place], 4th Floor   [address] [place] [address]   Appointments: [phone]    WHERE TO GO FOR CARE?  Clinic    Make an appointment if you:       Are sick (cold, cough, flu, sore throat, earache or in pain).       Have a small injury (sprain, small cut, burn or broken bone).       Need a physical exam, Pap smear, vaccine or prescription refill.       Have questions about your health or medicines.    To reach us:      Call [phone] ([phone]). Open 24 hours every day. (For counseling services, call [phone].)    Log into [employer] at [email or web address]. (Visit [email or web address] to create an account.) Hospital emergency room    An emergency is a serious or life- threatening problem that must be treated right away.    Call 323 or get to the hospital if you have:      Very bad or sudden:            - Chest pain or pressure         - Bleeding         - Head or belly pain         - Dizziness or trouble seeing, walking or                          Speaking      Problems breathing      Blood in your vomit or you are coughing up blood      A major injury (knocked out, loss of a finger or limb, rape, broken bone protruding from skin)    A mental health crisis. (Or call the Mental Health Crisis line at 1-724.827.8068 or Suicide Prevention Hotline at 1-849.591.2106.)    Open 24 hours every day. You don't need an appointment.     Urgent care    Visit urgent care for sickness or small injuries when the  clinic is closed. You don't need an appointment. To check hours or find an urgent care near you, visit [email or web address]. Online care    Get online care from OnCare for more than 70 common problems, like colds, allergies and infections. Open 24 hours every day at:   [email or web address]   Need help deciding?    For advice about where to be seen, you may call your clinic and ask to speak with a nurse. We're here for you 24 hours every day.         If you are deaf or hard of hearing, please let us know. We provide many free services including sign language interpreters, oral interpreters, TTYs, telephone amplifiers, note takers and written materials.

## 2019-03-26 NOTE — PROGRESS NOTES
SUBJECTIVE:   Josh Wolff is a 38 year old female who presents to clinic today for the following health issues:      Anxiety Follow-Up    Status since last visit: Feeling about the same as last visit. Started trazodone after that visit.     Other associated symptoms:None    Complicating factors:   Significant life event: No   Current substance abuse: None  Depression symptoms: Not really  LELA-7 SCORE 2016   Total Score - - -   Total Score 10 5 3     She begins to cry when speaking about her anxiety and death of her mother in . Has 4 kids, works as RN part time at [employer].   Feels overwhelmed. Worse in the evenings. On Lexapro 20 mg daily. Trazodone helped with sleep. Lack of energy.     LELA-7    Amount of exercise or physical activity: None    Problems taking medications regularly: No    Medication side effects: none    Diet: regular (no restrictions)      Problem list and histories reviewed & adjusted, as indicated.  Additional history: as documented    Patient Active Problem List   Diagnosis     psoriasis     CARDIOVASCULAR SCREENING; LDL GOAL LESS THAN 160     History of shingles     S/P  section     Anxiety     Episodic tension-type headache, not intractable     Psychophysiological insomnia     Past Surgical History:   Procedure Laterality Date     C  DELIVERY ONLY  06     C I&D,THYROGLOSSAL CYST,INFECTED  childhood     C REPAIR CRUCIATE LIGAMENT,KNEE      LEFT     C/SECTION, LOW TRANSVERSE       , Low Transverse x 2      SECTION N/A 2014    Procedure:  SECTION;  Surgeon: Addis Infante MD;  Location: [facility] L+D     DILATION AND CURETTAGE SUCTION  2013    Procedure: DILATION AND CURETTAGE SUCTION;  Suction Dilation and Curettage;  Surgeon: Addis Infante MD;  Location: [facility] OR     ENT SURGERY      Thyroglossal duct cyst       Social History     Tobacco Use     Smoking status: Never Smoker     Smokeless  "tobacco: Never Used   Substance Use Topics     Alcohol use: No     Family History   Problem Relation Age of Onset     Cancer Mother         non-hodgkins lymphoma since 1997     Diabetes Maternal Grandmother      Cancer Maternal Grandmother         colon age 60's     Hypertension Paternal Grandfather      Heart Disease Paternal Grandfather            Reviewed and updated as needed this visit by clinical staff       Reviewed and updated as needed this visit by Provider         ROS:  Constitutional, HEENT, cardiovascular, pulmonary, GI, , musculoskeletal, neuro, skin, endocrine and psych systems are negative, except as otherwise noted.    OBJECTIVE:     /68   Pulse 70   Temp 98.9  F (37.2  C) (Oral)   Ht 1.632 m (5' 4.25\")   Wt 78.5 kg (173 lb)   BMI 29.46 kg/m    Body mass index is 29.46 kg/m .  GENERAL: healthy, alert and no distress  RESP: lungs clear to auscultation - no rales, rhonchi or wheezes  CV: regular rate and rhythm, normal S1 S2, no S3 or S4, no murmur, click or rub, no peripheral edema and peripheral pulses strong  PSYCH: anxious, begins to cry when talking about her anxiety, death of mother. No SI.     Diagnostic Test Results:  none     ASSESSMENT/PLAN:     1. LELA (generalized anxiety disorder)  Uncontrolled. Spent majority of encounter discussing strategies to help with improving anxiety, CBT, diet/exercise, improved sleep. Will augment lexapro with Wellbutrin. Recommend Vit D 4000 U daily.   - buPROPion (WELLBUTRIN XL) 150 MG 24 hr tablet; Take 1 tablet (150 mg) by mouth every morning  Dispense: 60 tablet; Refill: 1    JOSELITO Vasquez [facility]  25 min spent in direct face to face time with this pt, greater than 50% in counseling depression, anxiety,diet, medications    "

## 2019-03-27 ASSESSMENT — ANXIETY QUESTIONNAIRES: GAD7 TOTAL SCORE: 4

## 2019-04-23 ENCOUNTER — OFFICE VISIT (OUTPATIENT)
Dept: FAMILY MEDICINE | Facility: CLINIC | Age: 39
End: 2019-04-23
Payer: COMMERCIAL

## 2019-04-23 VITALS
WEIGHT: 171 LBS | DIASTOLIC BLOOD PRESSURE: 62 MMHG | HEIGHT: 64 IN | HEART RATE: 64 BPM | TEMPERATURE: 97.6 F | SYSTOLIC BLOOD PRESSURE: 101 MMHG | BODY MASS INDEX: 29.19 KG/M2

## 2019-04-23 DIAGNOSIS — F41.1 GAD (GENERALIZED ANXIETY DISORDER): Primary | ICD-10-CM

## 2019-04-23 DIAGNOSIS — R53.83 FATIGUE, UNSPECIFIED TYPE: ICD-10-CM

## 2019-04-23 LAB
T3FREE SERPL-MCNC: 2.5 PG/ML (ref 2.3–4.2)
VIT B12 SERPL-MCNC: 587 PG/ML (ref 193–986)

## 2019-04-23 PROCEDURE — 36415 COLL VENOUS BLD VENIPUNCTURE: CPT | Performed by: NURSE PRACTITIONER

## 2019-04-23 PROCEDURE — 82607 VITAMIN B-12: CPT | Performed by: NURSE PRACTITIONER

## 2019-04-23 PROCEDURE — 84439 ASSAY OF FREE THYROXINE: CPT | Performed by: NURSE PRACTITIONER

## 2019-04-23 PROCEDURE — 99214 OFFICE O/P EST MOD 30 MIN: CPT | Performed by: NURSE PRACTITIONER

## 2019-04-23 PROCEDURE — 84481 FREE ASSAY (FT-3): CPT | Performed by: NURSE PRACTITIONER

## 2019-04-23 ASSESSMENT — MIFFLIN-ST. JEOR: SCORE: 1449.62

## 2019-04-23 ASSESSMENT — PATIENT HEALTH QUESTIONNAIRE - PHQ9: SUM OF ALL RESPONSES TO PHQ QUESTIONS 1-9: 6

## 2019-04-23 NOTE — PROGRESS NOTES
"  SUBJECTIVE:   Josh Wolff is a 38 year old female who presents to clinic today for the following   health issues:      Depression and Anxiety Follow-Up    Status since last visit: not getting better, more fatigue     Other associated symptoms: none    Complicating factors:     Significant life event: No     Current substance abuse: None    PHQ 2016   PHQ-9 Total Score 9 9 8   Q9: Thoughts of better off dead/self-harm past 2 weeks Not at all Not at all Not at all     LELA-7 SCORE 2018 2019 3/26/2019   Total Score - - -   Total Score 5 3 4   says that her fatigue has remained and in fact she doesn't notice any difference from wellbutrin. Says that she is still quite irritable.   She says her TSH was normal last year but she read that T4T3 could still be \"out of whack\" and she wants this tested. Her Vit d was normal when last checked. She is also taking Vit D 4000 U daily      PHQ-9  English  PHQ-9   Any Language  LELA-7  Suicide Assessment Five-step Evaluation and Treatment (SAFE-T)    Amount of exercise or physical activity: None    Problems taking medications regularly: No    Medication side effects: very tired in the mornings after taking Trazodone     Diet: regular (no restrictions)        Additional history: as documented    Reviewed  and updated as needed this visit by clinical staff  Tobacco  Allergies  Meds  Med Hx  Surg Hx  Fam Hx  Soc Hx        Reviewed and updated as needed this visit by Provider         Patient Active Problem List   Diagnosis     psoriasis     CARDIOVASCULAR SCREENING; LDL GOAL LESS THAN 160     History of shingles     S/P  section     Anxiety     Episodic tension-type headache, not intractable     Psychophysiological insomnia     Past Surgical History:   Procedure Laterality Date     C  DELIVERY ONLY  06     C I&D,THYROGLOSSAL CYST,INFECTED  childhood     C REPAIR CRUCIATE LIGAMENT,KNEE      LEFT     C/SECTION, LOW " "TRANSVERSE       , Low Transverse x 2      SECTION N/A 2014    Procedure:  SECTION;  Surgeon: Addis Infante MD;  Location: [facility] L[facility]     DILATION AND CURETTAGE SUCTION  2013    Procedure: DILATION AND CURETTAGE SUCTION;  Suction Dilation and Curettage;  Surgeon: Addis Infante MD;  Location: [facility] OR     ENT SURGERY      Thyroglossal duct cyst       Social History     Tobacco Use     Smoking status: Never Smoker     Smokeless tobacco: Never Used   Substance Use Topics     Alcohol use: No     Family History   Problem Relation Age of Onset     Cancer Mother         non-hodgkins lymphoma since      Diabetes Maternal Grandmother      Cancer Maternal Grandmother         colon age 60's     Hypertension Paternal Grandfather      Heart Disease Paternal Grandfather            ROS:  Constitutional, HEENT, cardiovascular, pulmonary, GI, , musculoskeletal, neuro, skin, endocrine and psych systems are negative, except as otherwise noted.    OBJECTIVE:     /62   Pulse 64   Temp 97.6  F (36.4  C) (Oral)   Ht 1.632 m (5' 4.25\")   Wt 77.6 kg (171 lb)   BMI 29.12 kg/m    Body mass index is 29.12 kg/m .  GENERAL: healthy, alert and no distress  EYES: Eyes grossly normal to inspection, PERRL and conjunctivae and sclerae normal  NECK: no adenopathy, no asymmetry, masses, or scars and thyroid normal to palpation  RESP: lungs clear to auscultation - no rales, rhonchi or wheezes  CV: regular rate and rhythm, normal S1 S2, no S3 or S4, no murmur, click or rub, no peripheral edema and peripheral pulses strong  ABDOMEN: soft, nontender, no hepatosplenomegaly, no masses and bowel sounds normal  MS: no gross musculoskeletal defects noted, no edema    Diagnostic Test Results:  No results found for this or any previous visit (from the past 24 hour(s)).    ASSESSMENT/PLAN:     1. LELA (generalized anxiety disorder)  Reports fatigue, irritability and anxiety are uncontrolled " despite adding wellbutrin to lexapro 4 weeks ago. Discussed 2 plans: 1. Go up on wellbutrin and reassess in 3 weeks and if no improvement discontinue and trial another serotonin specific reuptake inhibitor or add buspar; 2. Discontinue wellbutrin and lexapro and switch to another serotonin specific reuptake inhibitor.   Patient preferred to titrate wellbutrin for now and reassess. Also discussed  referral and she accepted.   - MENTAL HEALTH REFERRAL  - Adult; Outpatient Treatment; Individual/Couples/Family/Group Therapy/Health Psychology; [facility]: [facility] [phone]; We will contact you to schedule the appointment or please call with any questions    2. Fatigue, unspecified type    - T4, free  - Vitamin B12    JOSELITO Vasquez [facility]

## 2019-04-23 NOTE — PATIENT INSTRUCTIONS
Increase wellbutirn 300 mg daily      referral     Patient Education     [facility]   Discharged by : Meliza Saldivar MA    Paper scripts provided to patient : no     If you have any questions regarding your visit please contact your care team:     Team Gold                Clinic Hours Telephone Number     Dr. Samantha Patel, CNP 7am-7pm  Monday - Thursday   7am-5pm  Fridays  [phone]   (Appointment scheduling available 24/7)     RN Line  [phone] option 2     [facility] [place] [facility] [place] [facility] 11am-9pm Monday-Friday Saturday-Sunday- 9am-5pm     [place] -   5pm-9pm Monday-Friday Saturday-Sunday- 9am-5pm    [phone] - [place]    [phone] - [place]     For a Price Quote for your services, please call our Consumer Price Line at [phone].     What options do I have for visits at the clinic other than the traditional office visit?     To expand how we care for you, many of our providers are utilizing electronic visits (e-visits) and telephone visits, when medically appropriate, for interactions with their patients rather than a visit in the clinic. We also offer nurse visits for many medical concerns. Just like any other service, we will bill your insurance company for this type of visit based on time spent on the phone with your provider. Not all insurance companies cover these visits. Please check with your medical insurance if this type of visit is covered. You will be responsible for any charges that are not paid by your insurance.   E-visits via Favor: generally incur a $45.00 fee.     Telephone visits:  Time spent on the phone: *charged based on time that is spent on the phone in increments of 10 minutes. Estimated cost:   5-10 mins $30.00   11-20 mins. $59.00   21-30 mins. $85.00     Use Favor (secure email communication and access to your chart) to send your primary care provider a message or make  an appointment. Ask someone on your Team how to sign up for [employer].     As always, Thank you for trusting us with your health care needs!    [facility]:    Scheduling Appointments  [facility]  Call: [phone]    Jelena Flower [facility]  Call: [phone]    [facility]  Call: [phone]    For Gastroenterology referrals   [facility]   Clinics and Surgery [place], 4th Floor   [address] [place] [address]   Appointments: [phone]    WHERE TO GO FOR CARE?  Clinic    Make an appointment if you:       Are sick (cold, cough, flu, sore throat, earache or in pain).       Have a small injury (sprain, small cut, burn or broken bone).       Need a physical exam, Pap smear, vaccine or prescription refill.       Have questions about your health or medicines.    To reach us:      Call [phone] ([phone]). Open 24 hours every day. (For counseling services, call [phone].)    Log into [employer] at [email or web address]. (Visit Smart [email or web address] to create an account.) Hospital emergency room    An emergency is a serious or life- threatening problem that must be treated right away.    Call 340 or get to the hospital if you have:      Very bad or sudden:            - Chest pain or pressure         - Bleeding         - Head or belly pain         - Dizziness or trouble seeing, walking or                          Speaking      Problems breathing      Blood in your vomit or you are coughing up blood      A major injury (knocked out, loss of a finger or limb, rape, broken bone protruding from skin)    A mental health crisis. (Or call the Mental Health Crisis line at 1-183.573.8064 or Suicide Prevention Hotline at 1-661.142.4901.)    Open 24 hours every day. You don't need an appointment.     Urgent care    Visit urgent care for sickness or small injuries when the clinic is closed. You don't need an  appointment. To check hours or find an urgent care near you, visit [email or web address]. Online care    Get online care from [facility] for more than 70 common problems, like colds, allergies and infections. Open 24 hours every day at:   [email or web address]   Need help deciding?    For advice about where to be seen, you may call your clinic and ask to speak with a nurse. We're here for you 24 hours every day.         If you are deaf or hard of hearing, please let us know. We provide many free services including sign language interpreters, oral interpreters, TTYs, telephone amplifiers, note takers and written materials.

## 2019-04-24 LAB — T4 FREE SERPL-MCNC: 0.9 NG/DL (ref 0.76–1.46)

## 2019-05-09 ENCOUNTER — MYC MEDICAL ADVICE (OUTPATIENT)
Dept: FAMILY MEDICINE | Facility: CLINIC | Age: 39
End: 2019-05-09

## 2019-05-09 ENCOUNTER — MYC REFILL (OUTPATIENT)
Dept: FAMILY MEDICINE | Facility: CLINIC | Age: 39
End: 2019-05-09

## 2019-05-09 DIAGNOSIS — F41.1 GAD (GENERALIZED ANXIETY DISORDER): Primary | ICD-10-CM

## 2019-05-09 DIAGNOSIS — F41.1 GAD (GENERALIZED ANXIETY DISORDER): ICD-10-CM

## 2019-05-09 RX ORDER — BUPROPION HYDROCHLORIDE 150 MG/1
150 TABLET ORAL EVERY MORNING
Qty: 60 TABLET | Refills: 1 | Status: CANCELLED | OUTPATIENT
Start: 2019-05-09

## 2019-05-09 RX ORDER — BUPROPION HYDROCHLORIDE 300 MG/1
300 TABLET ORAL EVERY MORNING
Qty: 90 TABLET | Refills: 6 | Status: SHIPPED | OUTPATIENT
Start: 2019-05-09 | End: 2020-06-01

## 2019-05-09 NOTE — TELEPHONE ENCOUNTER
"Requested Prescriptions   Pending Prescriptions Disp Refills     buPROPion (WELLBUTRIN XL) 150 MG 24 hr tablet  Last Written Prescription Date:  3/26/2019  Last Fill Quantity: 60 tablet,  # refills: 1   Last office visit: 4/23/2019 with prescribing provider:  MARY Patel   Future Office Visit:     60 tablet 1     Sig: Take 1 tablet (150 mg) by mouth every morning       SSRIs Protocol Passed - 5/9/2019 11:11 AM  PHQ-9 SCORE 11/23/2016 1/22/2018 4/23/2019   PHQ-9 Total Score - - -   PHQ-9 Total Score 9 8 6     LELA-7 SCORE 1/22/2018 2/5/2019 3/26/2019   Total Score - - -   Total Score 5 3 4           Passed - Recent (12 mo) or future (30 days) visit within the authorizing provider's specialty     Patient had office visit in the last 12 months or has a visit in the next 30 days with authorizing provider or within the authorizing provider's specialty.  See \"Patient Info\" tab in inbasket, or \"Choose Columns\" in Meds & Orders section of the refill encounter.              Passed - Medication is Bupropion     If the medication is Bupropion (Wellbutrin), and the patient is taking for smoking cessation; OK to refill.          Passed - Medication is active on med list        Passed - Patient is age 18 or older        Passed - No active pregnancy on record        Passed - No positive pregnancy test in last 12 months          "

## 2019-05-09 NOTE — TELEPHONE ENCOUNTER
See [employer] message encounter for refill on correct dose of medication.    Isela Judd RN

## 2019-05-09 NOTE — TELEPHONE ENCOUNTER
See MyChart message below. Patient states that the Wellbutrin mg is working well for her. Medication pended.  Thank you,  Regino Pierce RN

## 2019-11-07 ENCOUNTER — HEALTH MAINTENANCE LETTER (OUTPATIENT)
Age: 39
End: 2019-11-07

## 2020-02-17 DIAGNOSIS — F41.9 ANXIETY: ICD-10-CM

## 2020-03-02 NOTE — TELEPHONE ENCOUNTER
Patient is out of the medication. Patient needs the refill today. It was sent to the provider on 2/17/20

## 2020-03-03 RX ORDER — ESCITALOPRAM OXALATE 20 MG/1
20 TABLET ORAL DAILY
Qty: 90 TABLET | Refills: 0 | Status: SHIPPED | OUTPATIENT
Start: 2020-03-03 | End: 2020-05-28

## 2020-03-03 RX ORDER — ESCITALOPRAM OXALATE 20 MG/1
TABLET ORAL
Qty: 90 TABLET | Refills: 0 | Status: SHIPPED | OUTPATIENT
Start: 2020-03-03 | End: 2020-03-03

## 2020-03-03 NOTE — TELEPHONE ENCOUNTER
"It appears this was in the refill error pool on 2/17/20.    Requested Prescriptions   Pending Prescriptions Disp Refills     escitalopram (LEXAPRO) 20 MG tablet [Pharmacy Med Name: ESCITALOPRAM 20 MG TABLET] 90 tablet 0     Sig: TAKE 1 TABLET BY MOUTH ONCE DAILY       Last Written Prescription Date:  2/5/19  Last Fill Quantity: 90,  # refills: 3   Last office visit: 4/23/2019 with prescribing provider:  4/23/19   Future Office Visit:      Nothing scheduled, was seen 4/23, wellbutrin increased, she followed up via [facility] as advised.      SSRIs Protocol Passed - 3/2/2020  2:10 PM        Passed - Recent (12 mo) or future (30 days) visit within the authorizing provider's specialty     Patient has had an office visit with the authorizing provider or a provider within the authorizing providers department within the previous 12 mos or has a future within next 30 days. See \"Patient Info\" tab in inbasket, or \"Choose Columns\" in Meds & Orders section of the refill encounter.              Passed - Medication is active on med list        Passed - Patient is age 18 or older        Passed - No active pregnancy on record        Passed - No positive pregnancy test in last 12 months          Prescription approved per [facility] Refill Protocol.    Juliette Xiong RN  [facility]        "

## 2020-05-23 DIAGNOSIS — F41.9 ANXIETY: ICD-10-CM

## 2020-05-28 DIAGNOSIS — F41.1 GAD (GENERALIZED ANXIETY DISORDER): ICD-10-CM

## 2020-05-28 RX ORDER — ESCITALOPRAM OXALATE 20 MG/1
20 TABLET ORAL DAILY
Qty: 30 TABLET | Refills: 0 | Status: SHIPPED | OUTPATIENT
Start: 2020-05-28 | End: 2020-06-03

## 2020-05-28 NOTE — TELEPHONE ENCOUNTER
Routing refill request to provider for review/approval because:  Drug interaction warning        Pending Prescriptions:                       Disp   Refills    escitalopram (LEXAPRO) 20 MG tablet [Pharm*30 tab*0        Sig: TAKE 1 TABLET BY MOUTH EVERY DAY      Addis Edouard RN

## 2020-06-01 RX ORDER — BUPROPION HYDROCHLORIDE 300 MG/1
TABLET ORAL
Qty: 30 TABLET | Refills: 0 | Status: SHIPPED | OUTPATIENT
Start: 2020-06-01 | End: 2020-06-03

## 2020-06-01 NOTE — TELEPHONE ENCOUNTER
"Requested Prescriptions   Pending Prescriptions Disp Refills     buPROPion (WELLBUTRIN XL) 300 MG 24 hr tablet [Pharmacy Med Name: BUPROPION HCL  MG TABLET] 90 tablet 4     Sig: TAKE 1 TABLET BY MOUTH ONCE IN THE MORNING       SSRIs Protocol Failed - 5/28/2020  8:17 AM        Failed - Recent (12 mo) or future (30 days) visit within the authorizing provider's specialty     Patient has had an office visit with the authorizing provider or a provider within the authorizing providers department within the previous 12 mos or has a future within next 30 days. See \"Patient Info\" tab in inbasket, or \"Choose Columns\" in Meds & Orders section of the refill encounter.              Passed - Medication is Bupropion     If the medication is Bupropion (Wellbutrin), and the patient is taking for smoking cessation; OK to refill.          Passed - Medication is active on med list        Passed - Patient is age 18 or older        Passed - No active pregnancy on record        Passed - No positive pregnancy test in last 12 months           Last visit 4/23/19, was advised phone follow up for mood.   Has not been done.   Wellbutrin was increased to 300 mg daily at that time.  "

## 2020-06-01 NOTE — TELEPHONE ENCOUNTER
I called and spoke to patient, she will be out of the 300 mg welbutrin tomorrow.   Offered to schedule virtual phone visit with PCP tomorrow; patient says she is due for annual physical, can she just come in and be seen for that?      Routed to PCP to address seeing patient for med check and annual visit in person this week.    I did send one month refill wellbutrin so patient won't run out if not seen tomorrow.    Juliette Xiong RN  [facility]

## 2020-06-02 NOTE — TELEPHONE ENCOUNTER
I called and spoke to patient, scheduled routine annual exam for tomorrow.    Juliette Xiong RN  [employer]

## 2020-06-03 ENCOUNTER — OFFICE VISIT (OUTPATIENT)
Dept: FAMILY MEDICINE | Facility: CLINIC | Age: 40
End: 2020-06-03
Payer: COMMERCIAL

## 2020-06-03 VITALS
SYSTOLIC BLOOD PRESSURE: 124 MMHG | DIASTOLIC BLOOD PRESSURE: 84 MMHG | TEMPERATURE: 98.3 F | BODY MASS INDEX: 28.49 KG/M2 | HEART RATE: 69 BPM | HEIGHT: 65 IN | WEIGHT: 171 LBS

## 2020-06-03 DIAGNOSIS — F41.1 GAD (GENERALIZED ANXIETY DISORDER): ICD-10-CM

## 2020-06-03 DIAGNOSIS — G44.219 EPISODIC TENSION-TYPE HEADACHE, NOT INTRACTABLE: ICD-10-CM

## 2020-06-03 DIAGNOSIS — L40.9 PSORIASIS: ICD-10-CM

## 2020-06-03 DIAGNOSIS — Z00.00 ROUTINE GENERAL MEDICAL EXAMINATION AT A HEALTH CARE FACILITY: Primary | ICD-10-CM

## 2020-06-03 DIAGNOSIS — F41.9 ANXIETY: ICD-10-CM

## 2020-06-03 PROCEDURE — 99395 PREV VISIT EST AGE 18-39: CPT | Performed by: NURSE PRACTITIONER

## 2020-06-03 PROCEDURE — 99214 OFFICE O/P EST MOD 30 MIN: CPT | Mod: 25 | Performed by: NURSE PRACTITIONER

## 2020-06-03 RX ORDER — ESCITALOPRAM OXALATE 20 MG/1
20 TABLET ORAL DAILY
Qty: 30 TABLET | Refills: 11 | Status: SHIPPED | OUTPATIENT
Start: 2020-06-03 | End: 2021-07-06

## 2020-06-03 RX ORDER — BUPROPION HYDROCHLORIDE 300 MG/1
TABLET ORAL
Qty: 30 TABLET | Refills: 11 | Status: SHIPPED | OUTPATIENT
Start: 2020-06-03 | End: 2021-07-06

## 2020-06-03 ASSESSMENT — PATIENT HEALTH QUESTIONNAIRE - PHQ9
SUM OF ALL RESPONSES TO PHQ QUESTIONS 1-9: 2
5. POOR APPETITE OR OVEREATING: NOT AT ALL

## 2020-06-03 ASSESSMENT — MIFFLIN-ST. JEOR: SCORE: 1448.59

## 2020-06-03 ASSESSMENT — ANXIETY QUESTIONNAIRES
7. FEELING AFRAID AS IF SOMETHING AWFUL MIGHT HAPPEN: NOT AT ALL
IF YOU CHECKED OFF ANY PROBLEMS ON THIS QUESTIONNAIRE, HOW DIFFICULT HAVE THESE PROBLEMS MADE IT FOR YOU TO DO YOUR WORK, TAKE CARE OF THINGS AT HOME, OR GET ALONG WITH OTHER PEOPLE: NOT DIFFICULT AT ALL
6. BECOMING EASILY ANNOYED OR IRRITABLE: SEVERAL DAYS
1. FEELING NERVOUS, ANXIOUS, OR ON EDGE: SEVERAL DAYS
5. BEING SO RESTLESS THAT IT IS HARD TO SIT STILL: NOT AT ALL
2. NOT BEING ABLE TO STOP OR CONTROL WORRYING: NOT AT ALL
GAD7 TOTAL SCORE: 2
3. WORRYING TOO MUCH ABOUT DIFFERENT THINGS: NOT AT ALL

## 2020-06-03 NOTE — PROGRESS NOTES
SUBJECTIVE:   CC: Josh Wolff is an 39 year old woman who presents for preventive health visit.     Healthy Habits:    Do you get at least three servings of calcium containing foods daily (dairy, green leafy vegetables, etc.)? yes    Amount of exercise or daily activities, outside of work: 3 day(s) per week    Problems taking medications regularly No    Medication side effects: No    Have you had an eye exam in the past two years? no    Do you see a dentist twice per year? yes    Do you have sleep apnea, excessive snoring or daytime drowsiness?no      Depression and Anxiety Follow-Up    How are you doing with your depression since your last visit? Worsened due to social stress    How are you doing with your anxiety since your last visit?  No change    Are you having other symptoms that might be associated with depression or anxiety? Yes:  fatigue at times     Have you had a significant life event? No     Do you have any concerns with your use of alcohol or other drugs? No    Wanting IUD placement   .Menstrual history:  Menarche: 13  Cycle length: 24  Cycle duration: 4-5  Symptoms: headache, mild cramping     Social History     Tobacco Use     Smoking status: Never Smoker     Smokeless tobacco: Never Used   Substance Use Topics     Alcohol use: No     Drug use: No     PHQ 11/23/2016 1/22/2018 4/23/2019   PHQ-9 Total Score 9 8 6   Q9: Thoughts of better off dead/self-harm past 2 weeks Not at all Not at all Not at all     LELA-7 SCORE 1/22/2018 2/5/2019 3/26/2019   Total Score - - -   Total Score 5 3 4     Last PHQ-9 6/3/2020   1.  Little interest or pleasure in doing things 0   2.  Feeling down, depressed, or hopeless 0   3.  Trouble falling or staying asleep, or sleeping too much 0   4.  Feeling tired or having little energy 1   5.  Poor appetite or overeating 1   6.  Feeling bad about yourself 0   7.  Trouble concentrating 0   8.  Moving slowly or restless 0   Q9: Thoughts of better off dead/self-harm past 2  weeks 0   PHQ-9 Total Score 2   Difficulty at work, home, or with people Somewhat difficult     LELA-7  6/3/2020   1. Feeling nervous, anxious, or on edge 1   2. Not being able to stop or control worrying 0   3. Worrying too much about different things 0   4. Trouble relaxing 0   5. Being so restless that it is hard to sit still 0   6. Becoming easily annoyed or irritable 1   7. Feeling afraid, as if something awful might happen 0   LELA-7 Total Score 2   If you checked any problems, how difficult have they made it for you to do your work, take care of things at home, or get along with other people? Not difficult at all       Suicide Assessment Five-step Evaluation and Treatment (SAFE-T)      Today's PHQ-2 Score:   PHQ-2 ( 1999 Pfizer) 2/5/2019 1/22/2018   Q1: Little interest or pleasure in doing things 0 1   Q2: Feeling down, depressed or hopeless 1 1   PHQ-2 Score 1 2   Q1: Little interest or pleasure in doing things Not at all Several days   Q2: Feeling down, depressed or hopeless Several days Several days   PHQ-2 Score 1 2       Abuse: Current or Past(Physical, Sexual or Emotional)- No  Do you feel safe in your environment? Yes        Social History     Tobacco Use     Smoking status: Never Smoker     Smokeless tobacco: Never Used   Substance Use Topics     Alcohol use: No     If you drink alcohol do you typically have >3 drinks per day or >7 drinks per week? No                     Reviewed orders with patient.  Reviewed health maintenance and updated orders accordingly - Yes  Lab work is in process    Mammo discussed, not appropriate for or declined by this patient.    Pertinent mammograms are reviewed under the imaging tab.  History of abnormal Pap smear: NO - age 30-65 PAP every 5 years with negative HPV co-testing recommended  PAP / HPV Latest Ref Rng & Units 1/22/2018 1/6/2015 12/7/2011   PAP - NIL NIL NIL   HPV 16 DNA NEG:Negative Negative - -   HPV 18 DNA NEG:Negative Negative - -   OTHER HR HPV  NEG:Negative Negative - -     Reviewed and updated as needed this visit by clinical staff         Reviewed and updated as needed this visit by Provider            ROS:  CONSTITUTIONAL: NEGATIVE for fever, chills, change in weight  INTEGUMENTARU/SKIN: NEGATIVE for worrisome rashes, moles or lesions  EYES: NEGATIVE for vision changes or irritation  ENT: NEGATIVE for ear, mouth and throat problems  RESP: NEGATIVE for significant cough or SOB  BREAST: NEGATIVE for masses, tenderness or discharge  CV: NEGATIVE for chest pain, palpitations or peripheral edema  GI: NEGATIVE for nausea, abdominal pain, heartburn, or change in bowel habits  : NEGATIVE for unusual urinary or vaginal symptoms. Periods are regular.  MUSCULOSKELETAL: NEGATIVE for significant arthralgias or myalgia  NEURO: NEGATIVE for weakness, dizziness or paresthesias  PSYCHIATRIC: NEGATIVE for changes in mood or affect    OBJECTIVE:   There were no vitals taken for this visit.  EXAM:  GENERAL: healthy, alert and no distress  EYES: Eyes grossly normal to inspection, PERRL and conjunctivae and sclerae normal  HENT: ear canals and TM's normal, nose and mouth without ulcers or lesions  NECK: no adenopathy, no asymmetry, masses, or scars and thyroid normal to palpation  RESP: lungs clear to auscultation - no rales, rhonchi or wheezes  BREAST: normal without masses, tenderness or nipple discharge and no palpable axillary masses or adenopathy  CV: regular rate and rhythm, normal S1 S2, no S3 or S4, no murmur, click or rub, no peripheral edema and peripheral pulses strong  ABDOMEN: soft, nontender, no hepatosplenomegaly, no masses and bowel sounds normal  MS: no gross musculoskeletal defects noted, no edema  SKIN: no suspicious lesions or rashes  NEURO: Normal strength and tone, mentation intact and speech normal  PSYCH: mentation appears normal, affect normal/bright    Diagnostic Test Results:  Labs reviewed in Epic  none     ASSESSMENT/PLAN:   1. Routine general  "medical examination at a health care facility  With additional concerns address. Referred to Dr. Gale for IUD placement     2. LELA (generalized anxiety disorder)  Stable refilled  - buPROPion (WELLBUTRIN XL) 300 MG 24 hr tablet; TAKE 1 TABLET BY MOUTH ONCE IN THE MORNING  Dispense: 30 tablet; Refill: 11    3. Anxiety  Stable refilled   - escitalopram (LEXAPRO) 20 MG tablet; Take 1 tablet (20 mg) by mouth daily  Dispense: 30 tablet; Refill: 11    4. Episodic tension-type headache, not intractable  Worsens around cycles recommend NSAIDS    5. psoriasis  Stable on Kenalog and temovate      COUNSELING:   Reviewed preventive health counseling, as reflected in patient instructions       Regular exercise       Healthy diet/nutrition    Estimated body mass index is 29.12 kg/m  as calculated from the following:    Height as of 4/23/19: 1.632 m (5' 4.25\").    Weight as of 4/23/19: 77.6 kg (171 lb).    Weight management plan: Discussed healthy diet and exercise guidelines     reports that she has never smoked. She has never used smokeless tobacco.      Counseling Resources:  ATP IV Guidelines  Pooled Cohorts Equation Calculator  Breast Cancer Risk Calculator  FRAX Risk Assessment  ICSI Preventive Guidelines  Dietary Guidelines for Americans, 2010  USDA's MyPlate  ASA Prophylaxis  Lung CA Screening    JOSELITO Vasquez CNP  [facility]  "

## 2020-06-03 NOTE — PATIENT INSTRUCTIONS
Make an appointment with Dr. Gale for IUD placement   Preventive Health Recommendations  Female Ages 26 - 39  Yearly exam:   See your health care provider every year in order to    Review health changes.     Discuss preventive care.      Review your medicines if you your doctor has prescribed any.    Until age 30: Get a Pap test every three years (more often if you have had an abnormal result).    After age 30: Talk to your doctor about whether you should have a Pap test every 3 years or have a Pap test with HPV screening every 5 years.   You do not need a Pap test if your uterus was removed (hysterectomy) and you have not had cancer.  You should be tested each year for STDs (sexually transmitted diseases), if you're at risk.   Talk to your provider about how often to have your cholesterol checked.  If you are at risk for diabetes, you should have a diabetes test (fasting glucose).  Shots: Get a flu shot each year. Get a tetanus shot every 10 years.   Nutrition:     Eat at least 5 servings of fruits and vegetables each day.    Eat whole-grain bread, whole-wheat pasta and brown rice instead of white grains and rice.    Get adequate Calcium and Vitamin D.     Lifestyle    Exercise at least 150 minutes a week (30 minutes a day, 5 days of the week). This will help you control your weight and prevent disease.    Limit alcohol to one drink per day.    No smoking.     Wear sunscreen to prevent skin cancer.    See your dentist every six months for an exam and cleaning.

## 2020-06-04 ASSESSMENT — ANXIETY QUESTIONNAIRES: GAD7 TOTAL SCORE: 2

## 2020-11-29 ENCOUNTER — HEALTH MAINTENANCE LETTER (OUTPATIENT)
Age: 40
End: 2020-11-29

## 2021-07-06 ENCOUNTER — VIRTUAL VISIT (OUTPATIENT)
Dept: FAMILY MEDICINE | Facility: CLINIC | Age: 41
End: 2021-07-06
Payer: COMMERCIAL

## 2021-07-06 DIAGNOSIS — Z13.1 SCREENING FOR DIABETES MELLITUS: ICD-10-CM

## 2021-07-06 DIAGNOSIS — Z13.220 SCREENING FOR HYPERLIPIDEMIA: ICD-10-CM

## 2021-07-06 DIAGNOSIS — L70.9 ACNE, UNSPECIFIED ACNE TYPE: ICD-10-CM

## 2021-07-06 DIAGNOSIS — F41.1 GAD (GENERALIZED ANXIETY DISORDER): Primary | ICD-10-CM

## 2021-07-06 DIAGNOSIS — Z13.29 SCREENING FOR THYROID DISORDER: ICD-10-CM

## 2021-07-06 PROCEDURE — 99214 OFFICE O/P EST MOD 30 MIN: CPT | Mod: TEL | Performed by: NURSE PRACTITIONER

## 2021-07-06 PROCEDURE — 96127 BRIEF EMOTIONAL/BEHAV ASSMT: CPT | Mod: 95 | Performed by: NURSE PRACTITIONER

## 2021-07-06 RX ORDER — BUPROPION HYDROCHLORIDE 300 MG/1
TABLET ORAL
Qty: 90 TABLET | Refills: 1 | Status: SHIPPED | OUTPATIENT
Start: 2021-07-06 | End: 2022-05-10

## 2021-07-06 RX ORDER — ESCITALOPRAM OXALATE 20 MG/1
20 TABLET ORAL DAILY
Qty: 90 TABLET | Refills: 1 | Status: SHIPPED | OUTPATIENT
Start: 2021-07-06 | End: 2022-05-10

## 2021-07-06 ASSESSMENT — ANXIETY QUESTIONNAIRES
1. FEELING NERVOUS, ANXIOUS, OR ON EDGE: SEVERAL DAYS
GAD7 TOTAL SCORE: 2
IF YOU CHECKED OFF ANY PROBLEMS ON THIS QUESTIONNAIRE, HOW DIFFICULT HAVE THESE PROBLEMS MADE IT FOR YOU TO DO YOUR WORK, TAKE CARE OF THINGS AT HOME, OR GET ALONG WITH OTHER PEOPLE: SOMEWHAT DIFFICULT
5. BEING SO RESTLESS THAT IT IS HARD TO SIT STILL: NOT AT ALL
6. BECOMING EASILY ANNOYED OR IRRITABLE: SEVERAL DAYS
3. WORRYING TOO MUCH ABOUT DIFFERENT THINGS: NOT AT ALL
2. NOT BEING ABLE TO STOP OR CONTROL WORRYING: NOT AT ALL
7. FEELING AFRAID AS IF SOMETHING AWFUL MIGHT HAPPEN: NOT AT ALL

## 2021-07-06 ASSESSMENT — PATIENT HEALTH QUESTIONNAIRE - PHQ9
SUM OF ALL RESPONSES TO PHQ QUESTIONS 1-9: 2
5. POOR APPETITE OR OVEREATING: NOT AT ALL

## 2021-07-06 NOTE — PROGRESS NOTES
Josh is a 40 year old who is being evaluated via a billable telephone visit.      What phone number would you like to be contacted at? [phone]  How would you like to obtain your AVS? Trihart    Assessment & Plan     LELA (generalized anxiety disorder)  Discussed monitoring symptoms versus re-starting lexapro. Pt wishes to re-start lexapro as anxiety was okay with being on medication. We discussed re-starting medication at either 5 or 10mg PO daily, if she gets to 10mg PO daily and nausea resolves and anxiety is stable then could remain at 10mg rather than increasing to 20mg.   - escitalopram (LEXAPRO) 20 MG tablet; Take 1 tablet (20 mg) by mouth daily  - buPROPion (WELLBUTRIN XL) 300 MG 24 hr tablet; TAKE 1 TABLET BY MOUTH ONCE IN THE MORNING  - Estradiol; Future  - Lutropin; Future  - Testosterone, total; Future  - Follicle stimulating hormone; Future    Screening for hyperlipidemia  - Lipid panel reflex to direct LDL Fasting; Future    Screening for thyroid disorder  - TSH with free T4 reflex; Future    Screening for diabetes mellitus  - Basic metabolic panel  (Ca, Cl, CO2, Creat, Gluc, K, Na, BUN); Future    Acne, unspecified acne type  Will check labs per pt request, pt to schedule fasting lab only visit.   - Estradiol; Future  - Lutropin; Future  - Testosterone, total; Future  - Follicle stimulating hormone; Future      Return in about 1 week (around 7/13/2021) for If symptoms worsen or fail to improve.    Rosalba Moreno NP  [facility]   Josh is a 40 year old who presents for the following health issues     HPI     Depression and Anxiety Follow-Up    How are you doing with your depression since your last visit? Stable with this medication regimen     How are you doing with your anxiety since your last visit?  Well managed     Are you having other symptoms that might be associated with depression or anxiety? some fluctuation with Mood With menstrual cycle.  "    Have you had a significant life event? No     Do you have any concerns with your use of alcohol or other drugs? No    Social History     Tobacco Use     Smoking status: Never Smoker     Smokeless tobacco: Never Used   Substance Use Topics     Alcohol use: No     Drug use: No     PHQ 4/23/2019 6/3/2020 7/6/2021   PHQ-9 Total Score 6 2 2   Q9: Thoughts of better off dead/self-harm past 2 weeks Not at all Not at all Not at all     LELA-7 SCORE 3/26/2019 6/3/2020 7/6/2021   Total Score - - -   Total Score 4 2 2       How many days per week do you miss taking your medication? 0    She states 2 weeks ago she started weaning off of lexapro. She went from taking 20mg to 10mg lexapro daily for 5-6 days followed by decreasing to 5mg for the next 5-6 days and then stopped the medication all together on Thursday last week. She states she felt exhausted and felt like she had a dark cloud over her face while taking the last few doses of 5mg and has now had nausea since Friday. Denies vomiting. Denies fevers and denies recently eating spoiled/bad food. She reports having troubles with constipation prior to weaning off lexapro but recently has noticed more soft/loose stools. Denies blood in stool.     She states over the past 6 months she has felt like a \"witch mom\" during her menstrual period. She states her periods occur about every 24 days and last 4 days. She reports having a migraine the day before she ovulates. Periods are not super heavy. She has been off of birth control for awhile now. She also reports noticing acne to her jaw line area. shes requesting labs to check hormone levels. She states she has an appointment next month with her PCP so she would like to have labs drawn at the same time (hormone labs as well as labs that would be drawn at physical).     Review of Systems   Constitutional, HEENT, cardiovascular, pulmonary, gi and gu systems are negative, except as otherwise noted.      Objective     "       Vitals:  No vitals were obtained today due to virtual visit.    Physical Exam   healthy, alert and no distress  PSYCH: Alert and oriented times 3; coherent speech, normal   rate and volume, able to articulate logical thoughts, able   to abstract reason, no tangential thoughts, no hallucinations   or delusions  Her affect is normal  RESP: No cough, no audible wheezing, able to talk in full sentences  Remainder of exam unable to be completed due to telephone visits      Phone call duration: 18 minutes

## 2021-07-07 ASSESSMENT — ANXIETY QUESTIONNAIRES: GAD7 TOTAL SCORE: 2

## 2021-07-31 ENCOUNTER — HEALTH MAINTENANCE LETTER (OUTPATIENT)
Age: 41
End: 2021-07-31

## 2021-08-02 ENCOUNTER — LAB (OUTPATIENT)
Dept: LAB | Facility: CLINIC | Age: 41
End: 2021-08-02
Payer: COMMERCIAL

## 2021-08-02 DIAGNOSIS — L70.9 ACNE, UNSPECIFIED ACNE TYPE: ICD-10-CM

## 2021-08-02 DIAGNOSIS — L65.9 HAIR LOSS: ICD-10-CM

## 2021-08-02 DIAGNOSIS — Z13.220 SCREENING FOR HYPERLIPIDEMIA: ICD-10-CM

## 2021-08-02 DIAGNOSIS — Z13.29 SCREENING FOR THYROID DISORDER: ICD-10-CM

## 2021-08-02 DIAGNOSIS — F41.1 GAD (GENERALIZED ANXIETY DISORDER): ICD-10-CM

## 2021-08-02 DIAGNOSIS — Z13.1 SCREENING FOR DIABETES MELLITUS: ICD-10-CM

## 2021-08-02 LAB
ESTRADIOL SERPL-MCNC: 77 PG/ML
FSH SERPL-ACNC: 4.9 IU/L
LH SERPL-ACNC: 7.2 IU/L

## 2021-08-02 PROCEDURE — 83002 ASSAY OF GONADOTROPIN (LH): CPT

## 2021-08-02 PROCEDURE — 84481 FREE ASSAY (FT-3): CPT

## 2021-08-02 PROCEDURE — 83001 ASSAY OF GONADOTROPIN (FSH): CPT

## 2021-08-02 PROCEDURE — 80061 LIPID PANEL: CPT

## 2021-08-02 PROCEDURE — 36415 COLL VENOUS BLD VENIPUNCTURE: CPT

## 2021-08-02 PROCEDURE — 80048 BASIC METABOLIC PNL TOTAL CA: CPT

## 2021-08-02 PROCEDURE — 82670 ASSAY OF TOTAL ESTRADIOL: CPT

## 2021-08-02 PROCEDURE — 84403 ASSAY OF TOTAL TESTOSTERONE: CPT

## 2021-08-02 PROCEDURE — 84443 ASSAY THYROID STIM HORMONE: CPT

## 2021-08-03 ENCOUNTER — OFFICE VISIT (OUTPATIENT)
Dept: FAMILY MEDICINE | Facility: CLINIC | Age: 41
End: 2021-08-03
Payer: COMMERCIAL

## 2021-08-03 ENCOUNTER — TELEPHONE (OUTPATIENT)
Dept: FAMILY MEDICINE | Facility: CLINIC | Age: 41
End: 2021-08-03

## 2021-08-03 VITALS
OXYGEN SATURATION: 98 % | TEMPERATURE: 97.9 F | SYSTOLIC BLOOD PRESSURE: 94 MMHG | HEART RATE: 69 BPM | WEIGHT: 179.4 LBS | BODY MASS INDEX: 29.89 KG/M2 | HEIGHT: 65 IN | DIASTOLIC BLOOD PRESSURE: 54 MMHG

## 2021-08-03 DIAGNOSIS — Z00.00 ROUTINE GENERAL MEDICAL EXAMINATION AT A HEALTH CARE FACILITY: Primary | ICD-10-CM

## 2021-08-03 DIAGNOSIS — F41.1 GAD (GENERALIZED ANXIETY DISORDER): ICD-10-CM

## 2021-08-03 DIAGNOSIS — Z30.011 ENCOUNTER FOR INITIAL PRESCRIPTION OF CONTRACEPTIVE PILLS: ICD-10-CM

## 2021-08-03 DIAGNOSIS — G43.829 MENSTRUAL MIGRAINE WITHOUT STATUS MIGRAINOSUS, NOT INTRACTABLE: ICD-10-CM

## 2021-08-03 DIAGNOSIS — L70.0 ACNE VULGARIS: ICD-10-CM

## 2021-08-03 DIAGNOSIS — L65.9 HAIR LOSS: ICD-10-CM

## 2021-08-03 LAB
ANION GAP SERPL CALCULATED.3IONS-SCNC: 6 MMOL/L (ref 3–14)
BUN SERPL-MCNC: 11 MG/DL (ref 7–30)
CALCIUM SERPL-MCNC: 9.1 MG/DL (ref 8.5–10.1)
CHLORIDE BLD-SCNC: 106 MMOL/L (ref 94–109)
CHOLEST SERPL-MCNC: 149 MG/DL
CO2 SERPL-SCNC: 25 MMOL/L (ref 20–32)
CREAT SERPL-MCNC: 1.01 MG/DL (ref 0.52–1.04)
FASTING STATUS PATIENT QL REPORTED: YES
GFR SERPL CREATININE-BSD FRML MDRD: 70 ML/MIN/1.73M2
GLUCOSE BLD-MCNC: 95 MG/DL (ref 70–99)
HDLC SERPL-MCNC: 46 MG/DL
LDLC SERPL CALC-MCNC: 88 MG/DL
NONHDLC SERPL-MCNC: 103 MG/DL
POTASSIUM BLD-SCNC: 3.8 MMOL/L (ref 3.4–5.3)
SODIUM SERPL-SCNC: 137 MMOL/L (ref 133–144)
T3FREE SERPL-MCNC: 2.2 PG/ML (ref 2.3–4.2)
TESTOST SERPL-MCNC: 15 NG/DL (ref 8–60)
TRIGL SERPL-MCNC: 75 MG/DL
TSH SERPL DL<=0.005 MIU/L-ACNC: 0.72 MU/L (ref 0.4–4)

## 2021-08-03 PROCEDURE — 99213 OFFICE O/P EST LOW 20 MIN: CPT | Mod: 25 | Performed by: NURSE PRACTITIONER

## 2021-08-03 PROCEDURE — 99395 PREV VISIT EST AGE 18-39: CPT | Performed by: NURSE PRACTITIONER

## 2021-08-03 RX ORDER — LEVONORGESTREL/ETHIN.ESTRADIOL 0.1-0.02MG
1 TABLET ORAL DAILY
Qty: 28 TABLET | Refills: 3 | Status: SHIPPED | OUTPATIENT
Start: 2021-08-03 | End: 2021-10-25

## 2021-08-03 ASSESSMENT — ENCOUNTER SYMPTOMS
BREAST MASS: 0
FEVER: 0
DYSURIA: 0
SHORTNESS OF BREATH: 0
EYE PAIN: 0
PALPITATIONS: 0
HEMATURIA: 0
COUGH: 0
HEMATOCHEZIA: 0
NAUSEA: 0
FREQUENCY: 0
DIZZINESS: 0
DIARRHEA: 0
CHILLS: 0
CONSTIPATION: 0
NERVOUS/ANXIOUS: 0
WEAKNESS: 0
MYALGIAS: 0
ABDOMINAL PAIN: 0
JOINT SWELLING: 0
HEARTBURN: 0
ARTHRALGIAS: 0
SORE THROAT: 0
PARESTHESIAS: 0
HEADACHES: 1

## 2021-08-03 ASSESSMENT — MIFFLIN-ST. JEOR: SCORE: 1484.62

## 2021-08-03 NOTE — PROGRESS NOTES
SUBJECTIVE:   CC: Josh Wolff is an 40 year old woman who presents for preventive health visit.       Patient has been advised of split billing requirements and indicates understanding: Yes  Healthy Habits:     Getting at least 3 servings of Calcium per day:  Yes    Bi-annual eye exam:  NO    Dental care twice a year:  Yes    Sleep apnea or symptoms of sleep apnea:  None    Diet:  Regular (no restrictions)    Frequency of exercise:  4-5 days/week    Duration of exercise:  15-30 minutes    Taking medications regularly:  Yes    Medication side effects:  Not applicable    PHQ-2 Total Score: 0    Additional concerns today:  Yes      On wellbutrin and Lexapro stable did phone visit with Raine Moreno     She feels hot, feels like she is losing a lot of hair. Did TSH and hormone labs with raine in process.     Headaches- day before ovulation and right before period  Excedrin helps.     Stopped BC pill because of dry old blood  Not using contraception.   Also has acne around her periods.   Not using anything     Today's PHQ-2 Score:   PHQ-2 ( 1999 Pf[employer]) 8/3/2021   Q1: Little interest or pleasure in doing things 0   Q2: Feeling down, depressed or hopeless 0   PHQ-2 Score 0   Q1: Little interest or pleasure in doing things Not at all   Q2: Feeling down, depressed or hopeless Not at all   PHQ-2 Score 0       Abuse: Current or Past (Physical, Sexual or Emotional) - No  Do you feel safe in your environment? Yes    Have you ever done Advance Care Planning? (For example, a Health Directive, POLST, or a discussion with a medical provider or your loved ones about your wishes): No, advance care planning information given to patient to review.  Patient declined advance care planning discussion at this time.    Social History     Tobacco Use     Smoking status: Never Smoker     Smokeless tobacco: Never Used   Substance Use Topics     Alcohol use: No     If you drink alcohol do you typically have >3 drinks per day or >7  drinks per week? No    Alcohol Use 8/3/2021   Prescreen: >3 drinks/day or >7 drinks/week? No   Prescreen: >3 drinks/day or >7 drinks/week? -       Reviewed orders with patient.  Reviewed health maintenance and updated orders accordingly - Yes  Lab work is in process    Breast Cancer Screening:  Any new diagnosis of family breast, ovarian, or bowel cancer? No    FHS-7:   Breast CA Risk Assessment (FHS-7) 8/3/2021   Did any of your first-degree relatives have breast or ovarian cancer? No   Did any of your relatives have bilateral breast cancer? No   Did any man in your family have breast cancer? No   Did any woman in your family have breast and ovarian cancer? No   Do you have 2 or more relatives with breast and/or ovarian cancer? No   Do you have 2 or more relatives with breast and/or bowel cancer? No     click delete button to remove this line now  Patient under 40 years of age: Routine Mammogram Screening not recommended.   Pertinent mammograms are reviewed under the imaging tab.    History of abnormal Pap smear: NO - age 30-65 PAP every 5 years with negative HPV co-testing recommended  PAP / HPV Latest Ref Rng & Units 1/22/2018 1/6/2015 12/7/2011   PAP (Historical) - NIL NIL NIL   HPV16 NEG:Negative Negative - -   HPV18 NEG:Negative Negative - -   HRHPV NEG:Negative Negative - -     Reviewed and updated as needed this visit by clinical staff  Tobacco  Allergies  Meds   Med Hx  Surg Hx  Fam Hx  Soc Hx        Reviewed and updated as needed this visit by Provider                    Review of Systems   Constitutional: Negative for chills and fever.   HENT: Negative for congestion, ear pain, hearing loss and sore throat.    Eyes: Negative for pain and visual disturbance.   Respiratory: Negative for cough and shortness of breath.    Cardiovascular: Negative for chest pain, palpitations and peripheral edema.   Gastrointestinal: Negative for abdominal pain, constipation, diarrhea, heartburn, hematochezia and nausea.  "  Breasts:  Negative for tenderness, breast mass and discharge.   Genitourinary: Negative for dysuria, frequency, genital sores, hematuria, pelvic pain, urgency, vaginal bleeding and vaginal discharge.   Musculoskeletal: Negative for arthralgias, joint swelling and myalgias.   Skin: Negative for rash.   Neurological: Positive for headaches. Negative for dizziness, weakness and paresthesias.   Psychiatric/Behavioral: Negative for mood changes. The patient is not nervous/anxious.         OBJECTIVE:   BP 94/54 (BP Location: Right arm, Patient Position: Chair, Cuff Size: Adult Large)   Pulse 69   Temp 97.9  F (36.6  C) (Oral)   Ht 1.643 m (5' 4.69\")   Wt 81.4 kg (179 lb 6.4 oz)   LMP 07/18/2021 (Exact Date)   SpO2 98%   Breastfeeding No   BMI 30.15 kg/m    Physical Exam  GENERAL: healthy, alert and no distress  EYES: Eyes grossly normal to inspection, PERRL and conjunctivae and sclerae normal  HENT: ear canals and TM's normal, nose and mouth without ulcers or lesions  NECK: no adenopathy, no asymmetry, masses, or scars and thyroid normal to palpation  RESP: lungs clear to auscultation - no rales, rhonchi or wheezes  BREAST: normal without masses, tenderness or nipple discharge and no palpable axillary masses or adenopathy  CV: regular rate and rhythm, normal S1 S2, no S3 or S4, no murmur, click or rub, no peripheral edema and peripheral pulses strong  ABDOMEN: soft, nontender, no hepatosplenomegaly, no masses and bowel sounds normal  MS: no gross musculoskeletal defects noted, no edema  SKIN: no suspicious lesions or rashes  NEURO: Normal strength and tone, mentation intact and speech normal  PSYCH: mentation appears normal, affect normal/bright    Diagnostic Test Results:  Labs reviewed in Epic  No results found for this or any previous visit (from the past 24 hour(s)).    ASSESSMENT/PLAN:     Problem List Items Addressed This Visit     LELA (generalized anxiety disorder)      Other Visit Diagnoses     Routine " "general medical examination at a health care facility    -  Primary    Relevant Orders    REVIEW OF HEALTH MAINTENANCE PROTOCOL ORDERS (Completed)    Menstrual migraine without status migrainosus, not intractable        Relevant Medications    levonorgestrel-ethinyl estradiol (AVIANE) 0.1-20 MG-MCG tablet    Other Relevant Orders    OFFICE/OUTPT VISIT,EST,LEVL III (Completed)    Hair loss        Relevant Orders    T3, Free    Acne vulgaris        Relevant Medications    levonorgestrel-ethinyl estradiol (AVIANE) 0.1-20 MG-MCG tablet    Other Relevant Orders    OFFICE/OUTPT VISIT,EST,LEVL III (Completed)    Encounter for initial prescription of contraceptive pills        Relevant Medications    levonorgestrel-ethinyl estradiol (AVIANE) 0.1-20 MG-MCG tablet    Other Relevant Orders    OFFICE/OUTPT VISIT,EST,LEVL III (Completed)      mood stable on meds no change  Recommend starting BCP for acne, migraine headache and contraception    COUNSELING:  Reviewed preventive health counseling, as reflected in patient instructions       Regular exercise       Healthy diet/nutrition    Estimated body mass index is 30.15 kg/m  as calculated from the following:    Height as of this encounter: 1.643 m (5' 4.69\").    Weight as of this encounter: 81.4 kg (179 lb 6.4 oz).    Weight management plan: Discussed healthy diet and exercise guidelines    She reports that she has never smoked. She has never used smokeless tobacco.      Counseling Resources:  ATP IV Guidelines  Pooled Cohorts Equation Calculator  Breast Cancer Risk Calculator  BRCA-Related Cancer Risk Assessment: FHS-7 Tool  FRAX Risk Assessment  ICSI Preventive Guidelines  Dietary Guidelines for Americans, 2010  USDA's MyPlate  ASA Prophylaxis  Lung CA Screening    JOSELITO Vasquez [facility]  "

## 2021-08-03 NOTE — TELEPHONE ENCOUNTER
Please call pt with lab results per her request: labs came back normal except HDL is slightly decreased which can be improved through increased exercise.

## 2021-08-26 ENCOUNTER — MYC MEDICAL ADVICE (OUTPATIENT)
Dept: FAMILY MEDICINE | Facility: CLINIC | Age: 41
End: 2021-08-26

## 2021-08-26 DIAGNOSIS — R94.6 ABNORMAL FINDING ON THYROID FUNCTION TEST: Primary | ICD-10-CM

## 2021-08-26 NOTE — TELEPHONE ENCOUNTER
Routing my chart message to PCP    Regarding T3  Patient would like to work on things and repeat test.    Is it essential she seen an endocrinologist?    Augustine Paul, RN, BSN, PHN  [facility]

## 2021-09-25 ENCOUNTER — HEALTH MAINTENANCE LETTER (OUTPATIENT)
Age: 41
End: 2021-09-25

## 2021-10-22 DIAGNOSIS — L40.9 PSORIASIS: ICD-10-CM

## 2021-10-22 DIAGNOSIS — G43.829 MENSTRUAL MIGRAINE WITHOUT STATUS MIGRAINOSUS, NOT INTRACTABLE: ICD-10-CM

## 2021-10-22 DIAGNOSIS — L70.0 ACNE VULGARIS: ICD-10-CM

## 2021-10-22 DIAGNOSIS — Z30.011 ENCOUNTER FOR INITIAL PRESCRIPTION OF CONTRACEPTIVE PILLS: ICD-10-CM

## 2021-10-22 NOTE — TELEPHONE ENCOUNTER
Routing refill request to provider for review/approval because:  Last saw pt for annual 8/3/21- okay to get a years worth of refills    Erica Chadwick RN

## 2021-10-25 RX ORDER — CLOBETASOL PROPIONATE 0.5 MG/ML
SOLUTION TOPICAL DAILY PRN
Qty: 50 ML | Refills: 3 | Status: SHIPPED | OUTPATIENT
Start: 2021-10-25 | End: 2022-12-01

## 2021-10-25 RX ORDER — LEVONORGESTREL/ETHIN.ESTRADIOL 0.1-0.02MG
1 TABLET ORAL DAILY
Qty: 84 TABLET | Refills: 4 | Status: SHIPPED | OUTPATIENT
Start: 2021-10-25 | End: 2022-12-01

## 2022-05-10 ENCOUNTER — OFFICE VISIT (OUTPATIENT)
Dept: FAMILY MEDICINE | Facility: CLINIC | Age: 42
End: 2022-05-10
Payer: COMMERCIAL

## 2022-05-10 VITALS
BODY MASS INDEX: 30.65 KG/M2 | RESPIRATION RATE: 16 BRPM | WEIGHT: 182.4 LBS | SYSTOLIC BLOOD PRESSURE: 115 MMHG | TEMPERATURE: 98.1 F | HEART RATE: 57 BPM | DIASTOLIC BLOOD PRESSURE: 64 MMHG

## 2022-05-10 DIAGNOSIS — F41.1 GAD (GENERALIZED ANXIETY DISORDER): ICD-10-CM

## 2022-05-10 DIAGNOSIS — M79.661 BILATERAL CALF PAIN: Primary | ICD-10-CM

## 2022-05-10 DIAGNOSIS — M79.662 BILATERAL CALF PAIN: Primary | ICD-10-CM

## 2022-05-10 DIAGNOSIS — L40.9 PSORIASIS: ICD-10-CM

## 2022-05-10 LAB
ALBUMIN SERPL-MCNC: 4 G/DL (ref 3.5–5)
ALP SERPL-CCNC: 87 U/L (ref 45–120)
ALT SERPL W P-5'-P-CCNC: 12 U/L (ref 0–45)
ANION GAP SERPL CALCULATED.3IONS-SCNC: 11 MMOL/L (ref 5–18)
AST SERPL W P-5'-P-CCNC: 15 U/L (ref 0–40)
BILIRUB SERPL-MCNC: 0.5 MG/DL (ref 0–1)
BUN SERPL-MCNC: 12 MG/DL (ref 8–22)
C REACTIVE PROTEIN LHE: 0.9 MG/DL (ref 0–0.8)
CALCIUM SERPL-MCNC: 9.5 MG/DL (ref 8.5–10.5)
CHLORIDE BLD-SCNC: 105 MMOL/L (ref 98–107)
CK SERPL-CCNC: 62 U/L (ref 30–190)
CO2 SERPL-SCNC: 24 MMOL/L (ref 22–31)
CREAT SERPL-MCNC: 0.8 MG/DL (ref 0.6–1.1)
ERYTHROCYTE [SEDIMENTATION RATE] IN BLOOD BY WESTERGREN METHOD: 6 MM/HR (ref 0–20)
GFR SERPL CREATININE-BSD FRML MDRD: >90 ML/MIN/1.73M2
GLUCOSE BLD-MCNC: 89 MG/DL (ref 70–125)
MAGNESIUM SERPL-MCNC: 2.1 MG/DL (ref 1.8–2.6)
POTASSIUM BLD-SCNC: 4.3 MMOL/L (ref 3.5–5)
PROT SERPL-MCNC: 6.4 G/DL (ref 6–8)
SODIUM SERPL-SCNC: 140 MMOL/L (ref 136–145)
TSH SERPL DL<=0.005 MIU/L-ACNC: 1.12 UIU/ML (ref 0.3–5)

## 2022-05-10 PROCEDURE — 86140 C-REACTIVE PROTEIN: CPT | Performed by: FAMILY MEDICINE

## 2022-05-10 PROCEDURE — 80053 COMPREHEN METABOLIC PANEL: CPT | Performed by: FAMILY MEDICINE

## 2022-05-10 PROCEDURE — 83735 ASSAY OF MAGNESIUM: CPT | Performed by: FAMILY MEDICINE

## 2022-05-10 PROCEDURE — 82550 ASSAY OF CK (CPK): CPT | Performed by: FAMILY MEDICINE

## 2022-05-10 PROCEDURE — 99214 OFFICE O/P EST MOD 30 MIN: CPT | Performed by: FAMILY MEDICINE

## 2022-05-10 PROCEDURE — 36415 COLL VENOUS BLD VENIPUNCTURE: CPT | Performed by: FAMILY MEDICINE

## 2022-05-10 PROCEDURE — 85652 RBC SED RATE AUTOMATED: CPT | Performed by: FAMILY MEDICINE

## 2022-05-10 PROCEDURE — 84443 ASSAY THYROID STIM HORMONE: CPT | Performed by: FAMILY MEDICINE

## 2022-05-10 RX ORDER — FLUOXETINE 10 MG/1
10 CAPSULE ORAL DAILY
Qty: 30 CAPSULE | Refills: 1 | Status: SHIPPED | OUTPATIENT
Start: 2022-05-10 | End: 2022-06-07

## 2022-05-10 ASSESSMENT — ANXIETY QUESTIONNAIRES
4. TROUBLE RELAXING: SEVERAL DAYS
7. FEELING AFRAID AS IF SOMETHING AWFUL MIGHT HAPPEN: NOT AT ALL
7. FEELING AFRAID AS IF SOMETHING AWFUL MIGHT HAPPEN: NOT AT ALL
3. WORRYING TOO MUCH ABOUT DIFFERENT THINGS: NOT AT ALL
8. IF YOU CHECKED OFF ANY PROBLEMS, HOW DIFFICULT HAVE THESE MADE IT FOR YOU TO DO YOUR WORK, TAKE CARE OF THINGS AT HOME, OR GET ALONG WITH OTHER PEOPLE?: SOMEWHAT DIFFICULT
1. FEELING NERVOUS, ANXIOUS, OR ON EDGE: MORE THAN HALF THE DAYS
GAD7 TOTAL SCORE: 6
5. BEING SO RESTLESS THAT IT IS HARD TO SIT STILL: NOT AT ALL
GAD7 TOTAL SCORE: 6
GAD7 TOTAL SCORE: 6
2. NOT BEING ABLE TO STOP OR CONTROL WORRYING: NOT AT ALL
6. BECOMING EASILY ANNOYED OR IRRITABLE: NEARLY EVERY DAY

## 2022-05-10 ASSESSMENT — PATIENT HEALTH QUESTIONNAIRE - PHQ9
SUM OF ALL RESPONSES TO PHQ QUESTIONS 1-9: 6
10. IF YOU CHECKED OFF ANY PROBLEMS, HOW DIFFICULT HAVE THESE PROBLEMS MADE IT FOR YOU TO DO YOUR WORK, TAKE CARE OF THINGS AT HOME, OR GET ALONG WITH OTHER PEOPLE: SOMEWHAT DIFFICULT
SUM OF ALL RESPONSES TO PHQ QUESTIONS 1-9: 6

## 2022-05-10 NOTE — PROGRESS NOTES
"  Assessment & Plan     Bilateral calf pain  Concern is mainly for compartment syndrome given her description of the pain.  Recommend referral to Orthopedics for further discussion and evaluation.  Awaiting response from patient.  Labs as below are unrevealing.  - Comprehensive metabolic panel (BMP + Alb, Alk Phos, ALT, AST, Total. Bili, TP)  - Magnesium  - TSH with free T4 reflex  - CK total  - ESR: Erythrocyte sedimentation rate  - CRP inflammation    Psoriasis  No evidence based on labs of inflammatory condition like psoriatic arthritis.  No real symptoms consistent with this either.  She will continue topical treatments for psoriasis, which has been well-controlled lately.    LELA (generalized anxiety disorder)  After a discussion of options, she will look into seeing a therapist and wishes to try a medication.  Decided on a low dose of fluoxetine.  Plan reassess mood in 4 weeks.  - FLUoxetine (PROZAC) 10 MG capsule; Take 1 capsule (10 mg) by mouth daily             BMI:   Estimated body mass index is 30.65 kg/m  as calculated from the following:    Height as of 8/3/21: 1.643 m (5' 4.69\").    Weight as of this encounter: 82.7 kg (182 lb 6.4 oz).           Return in about 4 weeks (around 6/7/2022) for Follow up mood, using a video visit.    Nydia Sadler MD  [facility]    Syed Moreno is a 41 year old who presents for the following health issues     History of Present Illness       Mental Health Follow-up:                    Today's PHQ-9         PHQ-9 Total Score: 6  PHQ-9 Q9 Thoughts of better off dead/self-harm past 2 weeks :   (P) Not at all    How difficult have these problems made it for you to do your work, take care of things at home, or get along with other people: Somewhat difficult    Today's LELA-7 Score: 6    Reason for visit:  Muscle tightness not relayed to exercise  Symptom onset:  More than a month  Symptoms include:  Tight calves  Symptom intensity:  " "Moderate  Symptom progression:  Staying the same  Had these symptoms before:  Yes  Has tried/received treatment for these symptoms:  No  What makes it worse:  Walking fast  What makes it better:  Stretching    She eats 2-3 servings of fruits and vegetables daily.She consumes 1 sweetened beverage(s) daily.She exercises with enough effort to increase her heart rate 10 to 19 minutes per day.  She exercises with enough effort to increase her heart rate 3 or less days per week.   She is taking medications regularly.     Patient presents today as a new patient to me to discuss primarily bilateral calf pain.  This has been ongoing for more than a year, and is worse with walking and exercise.  She describes a \"tightness\" in the calves that progressively worsens until it limits her ability to continue walking.  She wakes with some sensation of tightness in the calf muscles as well.  She has had some plantar fasciitis issues in the past.  She has never had PT or chiropractic care in relation to her leg pains.  They are equal bilaterally.  She has a diagnosis of psoriasis and uses some topical treatments for this.  Her skin has been good lately.  Occasionally she will have some \"stiffness\" in her wrists and fingers, maybe some decreased range of motion of her shoulders along with neck tightness.  She doesn't notice swelling or redness of any joints.  She thinks she was diagnosed with psoriasis at around age 2.  Next, she would like to talk about her mood.  She tapered herself off Lexapro and Wellbutrin due to frustration with obtaining refills.  She also thought she could probably do fine off medication as she was feeling well.  She reports bothersome irritability, especially for a week prior to her menses each month.  Her PHQ-9 score today is 6, with symptoms including little interest or pleasure, fatigue and feeling down.    Review of Systems   Constitutional, HEENT, cardiovascular, pulmonary, gi and gu systems are negative, " except as otherwise noted.      Objective    /64 (BP Location: Left arm, Patient Position: Sitting, Cuff Size: Adult Large)   Pulse 57   Temp 98.1  F (36.7  C) (Oral)   Resp 16   Wt 82.7 kg (182 lb 6.4 oz)   BMI 30.65 kg/m    Body mass index is 30.65 kg/m .  Physical Exam   GENERAL: healthy, alert and no distress  EYES: Eyes grossly normal to inspection, PERRL and conjunctivae and sclerae normal  RESP: lungs clear to auscultation - no rales, rhonchi or wheezes  CV: regular rate and rhythm, normal S1 S2, no S3 or S4, no murmur, click or rub, no peripheral edema and peripheral pulses strong including DP pulses bilaterally  MS: no gross musculoskeletal defects noted, no edema  SKIN: no suspicious lesions or rashes  NEURO: Normal strength and tone, mentation intact and speech normal  PSYCH: mentation appears normal, affect normal/bright    Results for orders placed or performed in visit on 05/10/22   Comprehensive metabolic panel (BMP + Alb, Alk Phos, ALT, AST, Total. Bili, TP)     Status: Normal   Result Value Ref Range    Sodium 140 136 - 145 mmol/L    Potassium 4.3 3.5 - 5.0 mmol/L    Chloride 105 98 - 107 mmol/L    Carbon Dioxide (CO2) 24 22 - 31 mmol/L    Anion Gap 11 5 - 18 mmol/L    Urea Nitrogen 12 8 - 22 mg/dL    Creatinine 0.80 0.60 - 1.10 mg/dL    Calcium 9.5 8.5 - 10.5 mg/dL    Glucose 89 70 - 125 mg/dL    Alkaline Phosphatase 87 45 - 120 U/L    AST 15 0 - 40 U/L    ALT 12 0 - 45 U/L    Protein Total 6.4 6.0 - 8.0 g/dL    Albumin 4.0 3.5 - 5.0 g/dL    Bilirubin Total 0.5 0.0 - 1.0 mg/dL    GFR Estimate >90 >60 mL/min/1.73m2   Magnesium     Status: Normal   Result Value Ref Range    Magnesium 2.1 1.8 - 2.6 mg/dL   TSH with free T4 reflex     Status: Normal   Result Value Ref Range    TSH 1.12 0.30 - 5.00 uIU/mL   CK total     Status: Normal   Result Value Ref Range    CK 62 30 - 190 U/L   ESR: Erythrocyte sedimentation rate     Status: Normal   Result Value Ref Range    Erythrocyte Sedimentation  Rate 6 0 - 20 mm/hr   CRP inflammation     Status: Abnormal   Result Value Ref Range    CRP 0.9 (H) 0.0 - 0.8 mg/dL

## 2022-05-11 ASSESSMENT — ANXIETY QUESTIONNAIRES: GAD7 TOTAL SCORE: 6

## 2022-05-11 ASSESSMENT — PATIENT HEALTH QUESTIONNAIRE - PHQ9: SUM OF ALL RESPONSES TO PHQ QUESTIONS 1-9: 6

## 2022-05-15 PROBLEM — M79.661 BILATERAL CALF PAIN: Status: ACTIVE | Noted: 2022-05-15

## 2022-05-15 PROBLEM — M79.662 BILATERAL CALF PAIN: Status: ACTIVE | Noted: 2022-05-15

## 2022-05-18 ENCOUNTER — MYC MEDICAL ADVICE (OUTPATIENT)
Dept: FAMILY MEDICINE | Facility: CLINIC | Age: 42
End: 2022-05-18
Payer: COMMERCIAL

## 2022-05-18 DIAGNOSIS — M79.661 BILATERAL CALF PAIN: Primary | ICD-10-CM

## 2022-05-18 DIAGNOSIS — M79.662 BILATERAL CALF PAIN: Primary | ICD-10-CM

## 2022-05-23 ENCOUNTER — TRANSFERRED RECORDS (OUTPATIENT)
Dept: HEALTH INFORMATION MANAGEMENT | Facility: CLINIC | Age: 42
End: 2022-05-23
Payer: COMMERCIAL

## 2022-06-07 ENCOUNTER — VIRTUAL VISIT (OUTPATIENT)
Dept: FAMILY MEDICINE | Facility: CLINIC | Age: 42
End: 2022-06-07
Payer: COMMERCIAL

## 2022-06-07 DIAGNOSIS — M79.661 BILATERAL CALF PAIN: ICD-10-CM

## 2022-06-07 DIAGNOSIS — F41.1 GAD (GENERALIZED ANXIETY DISORDER): Primary | ICD-10-CM

## 2022-06-07 DIAGNOSIS — M79.662 BILATERAL CALF PAIN: ICD-10-CM

## 2022-06-07 PROCEDURE — 99213 OFFICE O/P EST LOW 20 MIN: CPT | Mod: 95 | Performed by: FAMILY MEDICINE

## 2022-06-07 RX ORDER — FLUOXETINE 10 MG/1
10 CAPSULE ORAL DAILY
Qty: 90 CAPSULE | Refills: 0 | Status: SHIPPED | OUTPATIENT
Start: 2022-06-07 | End: 2022-09-15

## 2022-06-07 RX ORDER — FLUOXETINE 10 MG/1
10 CAPSULE ORAL DAILY
Qty: 90 CAPSULE | Refills: 2 | Status: CANCELLED | OUTPATIENT
Start: 2022-06-07

## 2022-06-07 ASSESSMENT — ANXIETY QUESTIONNAIRES
1. FEELING NERVOUS, ANXIOUS, OR ON EDGE: SEVERAL DAYS
7. FEELING AFRAID AS IF SOMETHING AWFUL MIGHT HAPPEN: NOT AT ALL
8. IF YOU CHECKED OFF ANY PROBLEMS, HOW DIFFICULT HAVE THESE MADE IT FOR YOU TO DO YOUR WORK, TAKE CARE OF THINGS AT HOME, OR GET ALONG WITH OTHER PEOPLE?: NOT DIFFICULT AT ALL
5. BEING SO RESTLESS THAT IT IS HARD TO SIT STILL: NOT AT ALL
6. BECOMING EASILY ANNOYED OR IRRITABLE: SEVERAL DAYS
GAD7 TOTAL SCORE: 2
2. NOT BEING ABLE TO STOP OR CONTROL WORRYING: NOT AT ALL
GAD7 TOTAL SCORE: 2
4. TROUBLE RELAXING: NOT AT ALL
GAD7 TOTAL SCORE: 2
7. FEELING AFRAID AS IF SOMETHING AWFUL MIGHT HAPPEN: NOT AT ALL
3. WORRYING TOO MUCH ABOUT DIFFERENT THINGS: NOT AT ALL

## 2022-06-07 ASSESSMENT — PATIENT HEALTH QUESTIONNAIRE - PHQ9
10. IF YOU CHECKED OFF ANY PROBLEMS, HOW DIFFICULT HAVE THESE PROBLEMS MADE IT FOR YOU TO DO YOUR WORK, TAKE CARE OF THINGS AT HOME, OR GET ALONG WITH OTHER PEOPLE: NOT DIFFICULT AT ALL
SUM OF ALL RESPONSES TO PHQ QUESTIONS 1-9: 2
SUM OF ALL RESPONSES TO PHQ QUESTIONS 1-9: 2

## 2022-06-07 NOTE — PROGRESS NOTES
"Josh is a 41 year old who is being evaluated via a billable video visit.      How would you like to obtain your AVS? MyChart  If the video visit is dropped, the invitation should be resent by: Text to cell phone: [phone]  Will anyone else be joining your video visit? No      Video Start Time: 10:30 AM    Assessment & Plan     LELA (generalized anxiety disorder)  Patient has been doing very well on her current low-dose of fluoxetine.  She does not desire any changes.  Refill provided today.  Plan reassess at her next physical exam in in August timeframe.  - FLUoxetine (PROZAC) 10 MG capsule; Take 1 capsule (10 mg) by mouth daily    Bilateral calf pain  Patient saw [facility], Dr. Monsivais who referred her to Dr. Beyer.  He also ordered ABIs.  Patient would like to do these through the [facility] system and so I reordered these today.  She may also choose to see orthopedics through [facility] instead.  - US DAVI Doppler No Exercise; Future  - Orthopedic  Referral; Future             BMI:   Estimated body mass index is 30.65 kg/m  as calculated from the following:    Height as of 8/3/21: 1.643 m (5' 4.69\").    Weight as of 5/10/22: 82.7 kg (182 lb 6.4 oz).           No follow-ups on file.    Nydia Sadler MD  [facility]    Seyd Moreno is a 41 year old who presents for the following health issues   HPI     Anxiety Follow-Up    How are you doing with your anxiety since your last visit? Improved     Are you having other symptoms that might be associated with anxiety? No    Have you had a significant life event? No     Are you feeling depressed? No    Do you have any concerns with your use of alcohol or other drugs? No    Social History     Tobacco Use     Smoking status: Never Smoker     Smokeless tobacco: Never Used   Substance Use Topics     Alcohol use: No     Drug use: No     LELA-7 SCORE 7/6/2021 5/10/2022 6/7/2022   Total Score - - -   Total Score " - 6 (mild anxiety) 2 (minimal anxiety)   Total Score 2 6 2     PHQ 7/6/2021 5/10/2022 6/7/2022   PHQ-9 Total Score 2 6 2   Q9: Thoughts of better off dead/self-harm past 2 weeks Not at all Not at all Not at all         How many servings of fruits and vegetables do you eat daily?  4 or more    On average, how many sweetened beverages do you drink each day (Examples: soda, juice, sweet tea, etc.  Do NOT count diet or artificially sweetened beverages)?   0    How many days per week do you exercise enough to make your heart beat faster? 4    How many minutes a day do you exercise enough to make your heart beat faster? 20 - 29    How many days per week do you miss taking your medication? 0    Patient presents today in follow-up of mood primarily.  She reports that she has been doing really well on a low-dose of fluoxetine.  She has not noted any side effects.  Her anxiety and depressive symptoms are under good control.  She does not desire any changes at present.  She did follow-up with [facility] in relation to her calf pain with exercise.  She was referred to another provider and ABIs were ordered.  She would like to do these in network if possible.  Interestingly, her calf pain has been a bit improved of late.    Review of Systems   Constitutional, HEENT, cardiovascular, pulmonary, gi and gu systems are negative, except as otherwise noted.      Objective           Vitals:  No vitals were obtained today due to virtual visit.    Physical Exam   GENERAL: Healthy, alert and no distress  EYES: Eyes grossly normal to inspection.  No discharge or erythema, or obvious scleral/conjunctival abnormalities.  RESP: No audible wheeze, cough, or visible cyanosis.  No visible retractions or increased work of breathing.    SKIN: Visible skin clear. No significant rash, abnormal pigmentation or lesions.  NEURO: Cranial nerves grossly intact.  Mentation and speech appropriate for age.  PSYCH: Mentation appears normal, affect  normal/bright, judgement and insight intact, normal speech and appearance well-groomed.    Diagnostics: None            Video-Visit Details    Type of service:  Video Visit    Video End Time:10:41 AM    Originating Location (pt. Location): Home    Distant Location (provider location):  [facility]     Platform used for Video Visit: [email or web address]

## 2022-06-14 ENCOUNTER — ANCILLARY PROCEDURE (OUTPATIENT)
Dept: ULTRASOUND IMAGING | Facility: CLINIC | Age: 42
End: 2022-06-14
Attending: FAMILY MEDICINE
Payer: COMMERCIAL

## 2022-06-14 DIAGNOSIS — M79.662 BILATERAL CALF PAIN: ICD-10-CM

## 2022-06-14 DIAGNOSIS — M79.661 BILATERAL CALF PAIN: ICD-10-CM

## 2022-06-14 PROCEDURE — 93922 UPR/L XTREMITY ART 2 LEVELS: CPT | Performed by: RADIOLOGY

## 2022-09-14 ENCOUNTER — MYC MEDICAL ADVICE (OUTPATIENT)
Dept: FAMILY MEDICINE | Facility: CLINIC | Age: 42
End: 2022-09-14

## 2022-09-14 DIAGNOSIS — F41.1 GAD (GENERALIZED ANXIETY DISORDER): ICD-10-CM

## 2022-09-15 RX ORDER — FLUOXETINE 10 MG/1
10 CAPSULE ORAL DAILY
Qty: 90 CAPSULE | Refills: 0 | Status: SHIPPED | OUTPATIENT
Start: 2022-09-15 | End: 2022-12-01

## 2022-09-15 NOTE — TELEPHONE ENCOUNTER
"?PCP    Last Written Prescription Date:  6/7/22  Last Fill Quantity: 90,  # refills: 0   Last office visit provider:  6/7/22     Requested Prescriptions   Pending Prescriptions Disp Refills     FLUoxetine (PROZAC) 10 MG capsule 90 capsule 0     Sig: Take 1 capsule (10 mg) by mouth daily       SSRIs Protocol Passed - 9/14/2022  2:09 PM        Passed - Recent (12 mo) or future (30 days) visit within the authorizing provider's specialty     Patient has had an office visit with the authorizing provider or a provider within the authorizing providers department within the previous 12 mos or has a future within next 30 days. See \"Patient Info\" tab in inbasket, or \"Choose Columns\" in Meds & Orders section of the refill encounter.              Passed - Medication is active on med list        Passed - Patient is age 18 or older        Passed - No active pregnancy on record        Passed - No positive pregnancy test in last 12 months             Juliette Montelongo, RN 09/14/22 9:20 PM  "

## 2022-12-01 ENCOUNTER — OFFICE VISIT (OUTPATIENT)
Dept: FAMILY MEDICINE | Facility: CLINIC | Age: 42
End: 2022-12-01
Payer: COMMERCIAL

## 2022-12-01 VITALS
WEIGHT: 155.2 LBS | HEART RATE: 56 BPM | DIASTOLIC BLOOD PRESSURE: 65 MMHG | SYSTOLIC BLOOD PRESSURE: 114 MMHG | BODY MASS INDEX: 26.5 KG/M2 | HEIGHT: 64 IN | RESPIRATION RATE: 16 BRPM

## 2022-12-01 DIAGNOSIS — L40.0 PSORIASIS VULGARIS: ICD-10-CM

## 2022-12-01 DIAGNOSIS — Z12.4 CERVICAL CANCER SCREENING: ICD-10-CM

## 2022-12-01 DIAGNOSIS — Z00.00 ROUTINE GENERAL MEDICAL EXAMINATION AT A HEALTH CARE FACILITY: Primary | ICD-10-CM

## 2022-12-01 DIAGNOSIS — G43.829 MENSTRUAL MIGRAINE WITHOUT STATUS MIGRAINOSUS, NOT INTRACTABLE: ICD-10-CM

## 2022-12-01 DIAGNOSIS — F41.1 GAD (GENERALIZED ANXIETY DISORDER): ICD-10-CM

## 2022-12-01 DIAGNOSIS — L70.0 ACNE VULGARIS: ICD-10-CM

## 2022-12-01 DIAGNOSIS — L40.9 PSORIASIS: ICD-10-CM

## 2022-12-01 PROCEDURE — 99396 PREV VISIT EST AGE 40-64: CPT | Performed by: FAMILY MEDICINE

## 2022-12-01 PROCEDURE — 99213 OFFICE O/P EST LOW 20 MIN: CPT | Mod: 25 | Performed by: FAMILY MEDICINE

## 2022-12-01 PROCEDURE — G0145 SCR C/V CYTO,THINLAYER,RESCR: HCPCS | Performed by: FAMILY MEDICINE

## 2022-12-01 PROCEDURE — 91312 COVID-19 VACCINE BIVALENT BOOSTER 12+ (PFIZER): CPT | Performed by: FAMILY MEDICINE

## 2022-12-01 PROCEDURE — 87624 HPV HI-RISK TYP POOLED RSLT: CPT | Performed by: FAMILY MEDICINE

## 2022-12-01 PROCEDURE — 0124A COVID-19 VACCINE BIVALENT BOOSTER 12+ (PFIZER): CPT | Performed by: FAMILY MEDICINE

## 2022-12-01 RX ORDER — LEVONORGESTREL/ETHIN.ESTRADIOL 0.1-0.02MG
1 TABLET ORAL DAILY
Qty: 112 TABLET | Refills: 3 | Status: SHIPPED | OUTPATIENT
Start: 2022-12-01 | End: 2024-06-04

## 2022-12-01 RX ORDER — FLUOXETINE 10 MG/1
10 CAPSULE ORAL DAILY
Qty: 90 CAPSULE | Refills: 3 | Status: SHIPPED | OUTPATIENT
Start: 2022-12-01 | End: 2023-12-14

## 2022-12-01 RX ORDER — CLOBETASOL PROPIONATE 0.5 MG/ML
SOLUTION TOPICAL DAILY PRN
Qty: 50 ML | Refills: 3
Start: 2022-12-01

## 2022-12-01 RX ORDER — TRIAMCINOLONE ACETONIDE 1 MG/G
OINTMENT TOPICAL
Qty: 80 G | Refills: 3
Start: 2022-12-01

## 2022-12-01 ASSESSMENT — ENCOUNTER SYMPTOMS
BREAST MASS: 1
DIZZINESS: 0
NERVOUS/ANXIOUS: 0
DIARRHEA: 0
JOINT SWELLING: 0
PALPITATIONS: 0
ABDOMINAL PAIN: 0
SHORTNESS OF BREATH: 0
HEARTBURN: 0
WEAKNESS: 0
COUGH: 0
PARESTHESIAS: 0
CHILLS: 0
HEADACHES: 0
HEMATURIA: 0
HEMATOCHEZIA: 0
SORE THROAT: 0
EYE PAIN: 0
FEVER: 0
CONSTIPATION: 0
NAUSEA: 0
DYSURIA: 0
MYALGIAS: 0
FREQUENCY: 0
ARTHRALGIAS: 1

## 2022-12-01 NOTE — PROGRESS NOTES
SUBJECTIVE:   CC: Josh is an 42 year old who presents for preventive health visit.     Patient has been advised of split billing requirements and indicates understanding: Yes     Healthy Habits:     Getting at least 3 servings of Calcium per day:  Yes    Bi-annual eye exam:  NO    Dental care twice a year:  Yes    Sleep apnea or symptoms of sleep apnea:  None    Diet:  Regular (no restrictions)    Frequency of exercise:  2-3 days/week    Duration of exercise:  15-30 minutes    Taking medications regularly:  Yes    Medication side effects:  None    PHQ-2 Total Score: 0    Additional concerns today:  No    Problems to Add:  1. Had a tick bite in October, small tick.  Knows that it was attached <48 hours.  No bullseye rash.  Feels more pain in her left knee and left 3rd MCP joint.  No joint redness or swelling, never had a full-body rash.  Discussed lyme disease testing and limitations, patient defers for now.  2. Has a lump under the skin of the left chest area.  Noticed for about the past 6 months.    Anxiety Follow-Up    How are you doing with your anxiety since your last visit? Improved     Are you having other symptoms that might be associated with anxiety? No    Have you had a significant life event? No     Are you feeling depressed? No    Do you have any concerns with your use of alcohol or other drugs? No    Social History     Tobacco Use     Smoking status: Never     Smokeless tobacco: Never   Substance Use Topics     Alcohol use: No     Drug use: No     LELA-7 SCORE 7/6/2021 5/10/2022 6/7/2022   Total Score - - -   Total Score - 6 (mild anxiety) 2 (minimal anxiety)   Total Score 2 6 2     PHQ 7/6/2021 5/10/2022 6/7/2022   PHQ-9 Total Score 2 6 2   Q9: Thoughts of better off dead/self-harm past 2 weeks Not at all Not at all Not at all     Social History     Tobacco Use     Smoking status: Never     Smokeless tobacco: Never   Substance Use Topics     Alcohol use: No       Alcohol Use 12/1/2022   Prescreen:  >3 drinks/day or >7 drinks/week? No   Prescreen: >3 drinks/day or >7 drinks/week? -       Reviewed orders with patient.  Reviewed health maintenance and updated orders accordingly - Yes    BP Readings from Last 3 Encounters:   22 114/65   05/10/22 115/64   21 94/54    Wt Readings from Last 3 Encounters:   22 70.4 kg (155 lb 3.2 oz)   05/10/22 82.7 kg (182 lb 6.4 oz)   21 81.4 kg (179 lb 6.4 oz)                  Patient Active Problem List   Diagnosis     Psoriasis     CARDIOVASCULAR SCREENING; LDL GOAL LESS THAN 160     History of shingles     Anxiety     Episodic tension-type headache, not intractable     Psychophysiological insomnia     LELA (generalized anxiety disorder)     Bilateral calf pain     Past Surgical History:   Procedure Laterality Date     C/SECTION, LOW TRANSVERSE       , Low Transverse x 2      SECTION N/A 2014    Procedure:  SECTION;  Surgeon: Addis Infante MD;  Location: [facility] L[facility]D     DILATION AND CURETTAGE SUCTION  2013    Procedure: DILATION AND CURETTAGE SUCTION;  Suction Dilation and Curettage;  Surgeon: Addis Infante MD;  Location: [facility] OR     ENT SURGERY      Thyroglossal duct cyst     ZZC  DELIVERY ONLY  06     ZZC I&D,THYROGLOSSAL CYST,INFECTED  childhood     ZZC REPAIR CRUCIATE LIGAMENT,KNEE      LEFT       Social History     Tobacco Use     Smoking status: Never     Smokeless tobacco: Never   Substance Use Topics     Alcohol use: No     Family History   Problem Relation Age of Onset     Cancer Mother         non-hodgkins lymphoma since      Diabetes Maternal Grandmother      Cancer Maternal Grandmother         colon age 60's     Hypertension Paternal Grandfather      Heart Disease Paternal Grandfather          Current Outpatient Medications   Medication Sig Dispense Refill     Acetaminophen (TYLENOL PO) Take 500 mg by mouth every 6 hours as needed for mild pain or fever       CLARITIN PO  prn        clobetasol (TEMOVATE) 0.05 % external solution Apply topically daily as needed For flares on scalp 50 mL 3     FLUoxetine (PROZAC) 10 MG capsule Take 1 capsule (10 mg) by mouth daily 90 capsule 0     triamcinolone (KENALOG) 0.1 % ointment Use twice daily as needed to active areas of psoriasis.  Avoid face, groin, armpits, buttock crease, and skin folds. Profile Rx: patient will contact pharmacy when needed 80 g 3     levonorgestrel-ethinyl estradiol (AVIANE) 0.1-20 MG-MCG tablet Take 1 tablet by mouth daily 84 tablet 4     No Known Allergies    Breast Cancer Screening:    FHS-7:   Breast CA Risk Assessment (FHS-7) 8/3/2021 12/1/2022   Did any of your first-degree relatives have breast or ovarian cancer? No No   Did any of your relatives have bilateral breast cancer? No No   Did any man in your family have breast cancer? No No   Did any woman in your family have breast and ovarian cancer? No No   Did any woman in your family have breast cancer before age 50 y? - No   Do you have 2 or more relatives with breast and/or ovarian cancer? No No   Do you have 2 or more relatives with breast and/or bowel cancer? No No       Mammogram Screening - Offered annual screening and updated Health Maintenance for mutual plan based on risk factor consideration    Pertinent mammograms are reviewed under the imaging tab.    History of abnormal Pap smear: NO - age 30-65 PAP every 5 years with negative HPV co-testing recommended  PAP / HPV Latest Ref Rng & Units 1/22/2018 1/6/2015 12/7/2011   PAP (Historical) - NIL NIL NIL   HPV16 NEG:Negative Negative - -   HPV18 NEG:Negative Negative - -   HRHPV NEG:Negative Negative - -     Reviewed and updated as needed this visit by clinical staff    Allergies  Meds              Reviewed and updated as needed this visit by Provider   Tobacco  Allergies  Meds  Problems  Med Hx  Surg Hx  Fam Hx  Soc   Hx         Review of Systems   Constitutional: Negative for chills and fever.  "  HENT: Negative for congestion, ear pain, hearing loss and sore throat.    Eyes: Negative for pain and visual disturbance.   Respiratory: Negative for cough and shortness of breath.    Cardiovascular: Negative for chest pain, palpitations and peripheral edema.   Gastrointestinal: Negative for abdominal pain, constipation, diarrhea, heartburn, hematochezia and nausea.   Breasts:  Positive for breast mass. Negative for tenderness and discharge.   Genitourinary: Negative for dysuria, frequency, genital sores, hematuria, pelvic pain, urgency, vaginal bleeding and vaginal discharge.   Musculoskeletal: Positive for arthralgias. Negative for joint swelling and myalgias.   Skin: Negative for rash.   Neurological: Negative for dizziness, weakness, headaches and paresthesias.   Psychiatric/Behavioral: Negative for mood changes. The patient is not nervous/anxious.         OBJECTIVE:   /65 (BP Location: Left arm, Patient Position: Sitting, Cuff Size: Adult Regular)   Pulse 56   Resp 16   Ht 1.632 m (5' 4.25\")   Wt 70.4 kg (155 lb 3.2 oz)   LMP 11/30/2022 (Exact Date)   BMI 26.43 kg/m    Physical Exam  GENERAL: healthy, alert and no distress  EYES: Eyes grossly normal to inspection, PERRL and conjunctivae and sclerae normal  HENT: ear canals and TM's normal, nose and mouth without ulcers or lesions  NECK: no adenopathy, no asymmetry, masses, or scars and thyroid normal to palpation  RESP: lungs clear to auscultation - no rales, rhonchi or wheezes  BREAST: normal without masses, tenderness or nipple discharge and no palpable axillary masses or adenopathy  CV: regular rate and rhythm, normal S1 S2, no S3 or S4, no murmur, click or rub, no peripheral edema and peripheral pulses strong  ABDOMEN: soft, nontender, no hepatosplenomegaly, no masses and bowel sounds normal   (female): normal female external genitalia, normal urethral meatus, vaginal mucosa pink, moist, well rugated, and normal cervix/adnexa/uterus without " masses or discharge  MS: no gross musculoskeletal defects noted, no edema  SKIN: no suspicious lesions or rashes; tiny subcutaneous nodule left chest area, mobile within the skin  NEURO: Normal strength and tone, mentation intact and speech normal  PSYCH: mentation appears normal, affect normal/bright    Diagnostic Test Results:  Labs reviewed in Epic  Pending at time of note    ASSESSMENT/PLAN:   1. Routine general medical examination at a health care facility  Routine history and physical, updated in EMR.  Blood sugar and thyroid updated in May this year, lipid profile normal last year.  Immunizations updated today along with pap smear.  Plan repeat physical in 1 year.    2. LELA (generalized anxiety disorder)  Doing well on current dose of fluoxetine.  Does not desire any changes.  - FLUoxetine (PROZAC) 10 MG capsule; Take 1 capsule (10 mg) by mouth daily  Dispense: 90 capsule; Refill: 3    3. Menstrual migraine without status migrainosus, not intractable  Doing well on current OCP.  Desires to use this continuously to have menses every 3 months.  - levonorgestrel-ethinyl estradiol (AVIANE) 0.1-20 MG-MCG tablet; Take 1 tablet by mouth daily  Dispense: 112 tablet; Refill: 3    4. Acne vulgaris  OCP refilled as below.  Non-smoker.  - levonorgestrel-ethinyl estradiol (AVIANE) 0.1-20 MG-MCG tablet; Take 1 tablet by mouth daily  Dispense: 112 tablet; Refill: 3    5. Psoriasis  Refill provided for clobetasol solution for the scalp.  - clobetasol (TEMOVATE) 0.05 % external solution; Apply topically daily as needed (psoriasis) For flares on scalp  Dispense: 50 mL; Refill: 3    7. Psoriasis vulgaris  Refill provided of triamcinolone for the body.  - triamcinolone (KENALOG) 0.1 % external ointment; Use twice daily as needed to active areas of psoriasis.  Avoid face, groin, armpits, buttock crease, and skin folds. Profile Rx: patient will contact pharmacy when needed  Dispense: 80 g; Refill: 3    8. Cervical cancer  screening  Routine screening pap smear updated today.  - Pap Screen with HPV - recommended age 30 - 65 years  - HPV Hold (Lab Only)      Patient has been advised of split billing requirements and indicates understanding: Yes      COUNSELING:  Reviewed preventive health counseling, as reflected in patient instructions  Special attention given to:        Regular exercise       Healthy diet/nutrition       Immunizations    Vaccinated for: Covid-19         Contraception        She reports that she has never smoked. She has never used smokeless tobacco.      Nydia Sadler MD  [facility]

## 2022-12-05 LAB
BKR LAB AP GYN ADEQUACY: NORMAL
BKR LAB AP GYN INTERPRETATION: NORMAL
BKR LAB AP HPV REFLEX: NORMAL
BKR LAB AP LMP: NORMAL
BKR LAB AP PREVIOUS ABNORMAL: NORMAL
PATH REPORT.COMMENTS IMP SPEC: NORMAL
PATH REPORT.COMMENTS IMP SPEC: NORMAL
PATH REPORT.RELEVANT HX SPEC: NORMAL

## 2022-12-07 LAB
HUMAN PAPILLOMA VIRUS 16 DNA: NEGATIVE
HUMAN PAPILLOMA VIRUS 18 DNA: NEGATIVE
HUMAN PAPILLOMA VIRUS FINAL DIAGNOSIS: NORMAL
HUMAN PAPILLOMA VIRUS OTHER HR: NEGATIVE

## 2023-12-03 DIAGNOSIS — G43.829 MENSTRUAL MIGRAINE WITHOUT STATUS MIGRAINOSUS, NOT INTRACTABLE: ICD-10-CM

## 2023-12-03 DIAGNOSIS — L70.0 ACNE VULGARIS: ICD-10-CM

## 2023-12-04 RX ORDER — LEVONORGESTREL AND ETHINYL ESTRADIOL 0.1-0.02MG
KIT ORAL
Qty: 28 TABLET | Refills: 11 | OUTPATIENT
Start: 2023-12-04

## 2023-12-08 ENCOUNTER — TELEPHONE (OUTPATIENT)
Dept: FAMILY MEDICINE | Facility: CLINIC | Age: 43
End: 2023-12-08
Payer: COMMERCIAL

## 2023-12-08 NOTE — TELEPHONE ENCOUNTER
Left message for patient to call back regarding refill request. Dr. Sadler would like to know if patient is going to continue to see her. If so, please schedule a physical or med check.

## 2023-12-14 ENCOUNTER — MYC REFILL (OUTPATIENT)
Dept: FAMILY MEDICINE | Facility: CLINIC | Age: 43
End: 2023-12-14
Payer: COMMERCIAL

## 2023-12-14 DIAGNOSIS — F41.1 GAD (GENERALIZED ANXIETY DISORDER): ICD-10-CM

## 2023-12-14 RX ORDER — FLUOXETINE 10 MG/1
10 CAPSULE ORAL DAILY
Qty: 90 CAPSULE | Refills: 0 | Status: SHIPPED | OUTPATIENT
Start: 2023-12-14 | End: 2024-03-15

## 2024-02-04 ENCOUNTER — HEALTH MAINTENANCE LETTER (OUTPATIENT)
Age: 44
End: 2024-02-04

## 2024-02-13 ENCOUNTER — E-VISIT (OUTPATIENT)
Dept: URGENT CARE | Facility: CLINIC | Age: 44
End: 2024-02-13
Payer: COMMERCIAL

## 2024-02-13 DIAGNOSIS — J01.90 ACUTE SINUSITIS, RECURRENCE NOT SPECIFIED, UNSPECIFIED LOCATION: Primary | ICD-10-CM

## 2024-02-13 PROCEDURE — 99421 OL DIG E/M SVC 5-10 MIN: CPT | Performed by: PREVENTIVE MEDICINE

## 2024-02-13 NOTE — PATIENT INSTRUCTIONS
Acute Sinusitis: Care Instructions  Overview     Acute sinusitis is an inflammation of the mucous membranes inside the nose and sinuses. Sinuses are the hollow spaces in your skull around the eyes and nose. Acute sinusitis often follows a cold. Acute sinusitis causes thick, discolored mucus that drains from the nose or down the back of the throat. It also can cause pain and pressure in your head and face along with a stuffy or blocked nose.  In most cases, sinusitis gets better on its own in 1 to 2 weeks. But some mild symptoms may last for several weeks. Sometimes antibiotics are needed if there is a bacterial infection.  Follow-up care is a key part of your treatment and safety. Be sure to make and go to all appointments, and call your doctor if you are having problems. It's also a good idea to know your test results and keep a list of the medicines you take.  How can you care for yourself at home?  Use saline (saltwater) nasal washes. This can help keep your nasal passages open and wash out mucus and allergens.  You can buy saline nose washes at a grocery store or drugstore. Follow the instructions on the package.  You can make your own at home. Add 1 teaspoon of non-iodized salt and 1 teaspoon of baking soda to 2 cups of distilled or boiled and cooled water. Fill a squeeze bottle or a nasal cleansing pot (such as a neti pot) with the nasal wash. Then put the tip into your nostril, and lean over the sink. With your mouth open, gently squirt the liquid. Repeat on the other side.  Try a decongestant nasal spray like oxymetazoline (Afrin). Do not use it for more than 3 days in a row. Using it for more than 3 days can make your congestion worse.  If needed, take an over-the-counter pain medicine, such as acetaminophen (Tylenol), ibuprofen (Advil, Motrin), or naproxen (Aleve). Read and follow all instructions on the label.  If the doctor prescribed antibiotics, take them as directed. Do not stop taking them just  "because you feel better. You need to take the full course of antibiotics.  Be careful when taking over-the-counter cold or flu medicines and Tylenol at the same time. Many of these medicines have acetaminophen, which is Tylenol. Read the labels to make sure that you are not taking more than the recommended dose. Too much acetaminophen (Tylenol) can be harmful.  Try a steroid nasal spray. It may help with your symptoms.  Breathe warm, moist air. You can use a steamy shower, a hot bath, or a sink filled with hot water. Avoid cold, dry air. Using a humidifier in your home may help. Follow the directions for cleaning the machine.  When should you call for help?   Call your doctor now or seek immediate medical care if:    You have new or worse swelling, redness, or pain in your face or around one or both of your eyes.     You have double vision or a change in your vision.     You have a high fever.     You have a severe headache and a stiff neck.     You have mental changes, such as feeling confused or much less alert.   Watch closely for changes in your health, and be sure to contact your doctor if:    You are not getting better as expected.   Where can you learn more?  Go to https://www.[employer].net/patiented  Enter I933 in the search box to learn more about \"Acute Sinusitis: Care Instructions.\"  Current as of: February 28, 2023               Content Version: 13.8    4066-2338 [employer].   Care instructions adapted under license by your healthcare professional. If you have questions about a medical condition or this instruction, always ask your healthcare professional. [employer] disclaims any warranty or liability for your use of this information.      You may want to try a nasal lavage (also known as nasal irrigation). You can find over-the-counter products, such as Neti-Pot, at retail locations or make your own at home. Instructions for homemade nasal lavage and more information on the " process are available online at http://www.aafp.org/afp/2009/1115/p1121.html.    Dear Josh Wolff    After reviewing your responses, I've been able to diagnose you with Acute sinusitis, recurrence not specified, unspecified location.      Based on your responses and diagnosis, I have prescribed   Orders Placed This Encounter   Medications     amoxicillin-clavulanate (AUGMENTIN) 875-125 MG tablet     Sig: Take 1 tablet by mouth 2 times daily for 7 days     Dispense:  14 tablet     Refill:  0      to treat your symptoms. I have sent this to your pharmacy.?     It is also important to stay well hydrated, get lots of rest and take over-the-counter decongestants,?tylenol?or ibuprofen if you?are able to?take those medications per your primary care provider to help relieve discomfort.?     It is important that you take?all of?your prescribed medication even if your symptoms are improving after a few doses.? Taking?all of?your medicine helps prevent the symptoms from returning.?     If your symptoms worsen, you develop severe headache, vomiting, high fever (>102), or are not improving in 7 days, please contact your primary care provider for an appointment or visit any of our convenient Walk-in Care or Urgent Care Centers to be seen which can be found on our website?here.?     Thanks again for choosing?us?as your health care partner,?   ?  Rl Sadler MD, MD?   Thank you for choosing us for your care. I have placed an order for a prescription so that you can start treatment. View your full visit summary for details by clicking on the link below. Your pharmacist will able to address any questions you may have about the medication.     If you're not feeling better within 5-7 days, please schedule an appointment.  You can schedule an appointment right here in [facility], or call [phone]  If the visit is for the same symptoms as your eVisit, we'll refund the cost of your eVisit if seen within seven  days.

## 2024-03-15 DIAGNOSIS — F41.1 GAD (GENERALIZED ANXIETY DISORDER): ICD-10-CM

## 2024-03-15 RX ORDER — FLUOXETINE 10 MG/1
10 CAPSULE ORAL DAILY
Qty: 30 CAPSULE | Refills: 2 | Status: SHIPPED | OUTPATIENT
Start: 2024-03-15 | End: 2024-06-04

## 2024-06-04 ENCOUNTER — OFFICE VISIT (OUTPATIENT)
Dept: FAMILY MEDICINE | Facility: CLINIC | Age: 44
End: 2024-06-04
Payer: COMMERCIAL

## 2024-06-04 VITALS
HEIGHT: 65 IN | DIASTOLIC BLOOD PRESSURE: 60 MMHG | SYSTOLIC BLOOD PRESSURE: 110 MMHG | OXYGEN SATURATION: 99 % | WEIGHT: 178 LBS | RESPIRATION RATE: 16 BRPM | HEART RATE: 60 BPM | BODY MASS INDEX: 29.66 KG/M2 | TEMPERATURE: 98.4 F

## 2024-06-04 DIAGNOSIS — Z13.220 LIPID SCREENING: ICD-10-CM

## 2024-06-04 DIAGNOSIS — Z12.31 VISIT FOR SCREENING MAMMOGRAM: ICD-10-CM

## 2024-06-04 DIAGNOSIS — G43.829 MENSTRUAL MIGRAINE WITHOUT STATUS MIGRAINOSUS, NOT INTRACTABLE: ICD-10-CM

## 2024-06-04 DIAGNOSIS — M25.50 MULTIPLE JOINT PAIN: ICD-10-CM

## 2024-06-04 DIAGNOSIS — Z00.00 ROUTINE GENERAL MEDICAL EXAMINATION AT A HEALTH CARE FACILITY: Primary | ICD-10-CM

## 2024-06-04 DIAGNOSIS — F41.1 GAD (GENERALIZED ANXIETY DISORDER): ICD-10-CM

## 2024-06-04 DIAGNOSIS — Z13.0 SCREENING FOR DEFICIENCY ANEMIA: ICD-10-CM

## 2024-06-04 LAB
ERYTHROCYTE [DISTWIDTH] IN BLOOD BY AUTOMATED COUNT: 12 % (ref 10–15)
ERYTHROCYTE [SEDIMENTATION RATE] IN BLOOD BY WESTERGREN METHOD: 5 MM/HR (ref 0–20)
HCT VFR BLD AUTO: 41.7 % (ref 35–47)
HGB BLD-MCNC: 14.1 G/DL (ref 11.7–15.7)
MCH RBC QN AUTO: 28.4 PG (ref 26.5–33)
MCHC RBC AUTO-ENTMCNC: 33.8 G/DL (ref 31.5–36.5)
MCV RBC AUTO: 84 FL (ref 78–100)
PLATELET # BLD AUTO: 204 10E3/UL (ref 150–450)
RBC # BLD AUTO: 4.97 10E6/UL (ref 3.8–5.2)
WBC # BLD AUTO: 5.1 10E3/UL (ref 4–11)

## 2024-06-04 PROCEDURE — 85652 RBC SED RATE AUTOMATED: CPT | Performed by: FAMILY MEDICINE

## 2024-06-04 PROCEDURE — 99214 OFFICE O/P EST MOD 30 MIN: CPT | Mod: 25 | Performed by: FAMILY MEDICINE

## 2024-06-04 PROCEDURE — 86140 C-REACTIVE PROTEIN: CPT | Performed by: FAMILY MEDICINE

## 2024-06-04 PROCEDURE — 80053 COMPREHEN METABOLIC PANEL: CPT | Performed by: FAMILY MEDICINE

## 2024-06-04 PROCEDURE — 85027 COMPLETE CBC AUTOMATED: CPT | Performed by: FAMILY MEDICINE

## 2024-06-04 PROCEDURE — 80061 LIPID PANEL: CPT | Performed by: FAMILY MEDICINE

## 2024-06-04 PROCEDURE — 36415 COLL VENOUS BLD VENIPUNCTURE: CPT | Performed by: FAMILY MEDICINE

## 2024-06-04 PROCEDURE — 99396 PREV VISIT EST AGE 40-64: CPT | Performed by: FAMILY MEDICINE

## 2024-06-04 RX ORDER — FLUOXETINE 10 MG/1
10 CAPSULE ORAL DAILY
Qty: 90 CAPSULE | Refills: 2 | Status: CANCELLED | OUTPATIENT
Start: 2024-06-04

## 2024-06-04 RX ORDER — LEVONORGESTREL AND ETHINYL ESTRADIOL 0.15-0.03
1 KIT ORAL DAILY
Qty: 112 TABLET | Refills: 3 | Status: SHIPPED | OUTPATIENT
Start: 2024-06-04

## 2024-06-04 SDOH — HEALTH STABILITY: PHYSICAL HEALTH: ON AVERAGE, HOW MANY DAYS PER WEEK DO YOU ENGAGE IN MODERATE TO STRENUOUS EXERCISE (LIKE A BRISK WALK)?: 3 DAYS

## 2024-06-04 ASSESSMENT — PATIENT HEALTH QUESTIONNAIRE - PHQ9
SUM OF ALL RESPONSES TO PHQ QUESTIONS 1-9: 3
SUM OF ALL RESPONSES TO PHQ QUESTIONS 1-9: 3
10. IF YOU CHECKED OFF ANY PROBLEMS, HOW DIFFICULT HAVE THESE PROBLEMS MADE IT FOR YOU TO DO YOUR WORK, TAKE CARE OF THINGS AT HOME, OR GET ALONG WITH OTHER PEOPLE: SOMEWHAT DIFFICULT

## 2024-06-04 ASSESSMENT — SOCIAL DETERMINANTS OF HEALTH (SDOH): HOW OFTEN DO YOU GET TOGETHER WITH FRIENDS OR RELATIVES?: ONCE A WEEK

## 2024-06-04 ASSESSMENT — ANXIETY QUESTIONNAIRES
GAD7 TOTAL SCORE: 2
5. BEING SO RESTLESS THAT IT IS HARD TO SIT STILL: NOT AT ALL
4. TROUBLE RELAXING: NOT AT ALL
7. FEELING AFRAID AS IF SOMETHING AWFUL MIGHT HAPPEN: NOT AT ALL
3. WORRYING TOO MUCH ABOUT DIFFERENT THINGS: NOT AT ALL
6. BECOMING EASILY ANNOYED OR IRRITABLE: SEVERAL DAYS
1. FEELING NERVOUS, ANXIOUS, OR ON EDGE: SEVERAL DAYS
8. IF YOU CHECKED OFF ANY PROBLEMS, HOW DIFFICULT HAVE THESE MADE IT FOR YOU TO DO YOUR WORK, TAKE CARE OF THINGS AT HOME, OR GET ALONG WITH OTHER PEOPLE?: SOMEWHAT DIFFICULT
GAD7 TOTAL SCORE: 2
2. NOT BEING ABLE TO STOP OR CONTROL WORRYING: NOT AT ALL
IF YOU CHECKED OFF ANY PROBLEMS ON THIS QUESTIONNAIRE, HOW DIFFICULT HAVE THESE PROBLEMS MADE IT FOR YOU TO DO YOUR WORK, TAKE CARE OF THINGS AT HOME, OR GET ALONG WITH OTHER PEOPLE: SOMEWHAT DIFFICULT
7. FEELING AFRAID AS IF SOMETHING AWFUL MIGHT HAPPEN: NOT AT ALL
GAD7 TOTAL SCORE: 2

## 2024-06-04 NOTE — PROGRESS NOTES
Preventive Care Visit  [facility]  Nydia Sadler MD, Family Medicine  Jun 4, 2024      Assessment & Plan     Routine general medical examination at a health care facility  Routine history and physical, updated in EMR.  Fasting labs updated as below.  Immunizations are up to date with the exception of COVID-19, patient declines today.  Mammogram discussed and ordered.  Pap smear is up to date, will be due in 2027.  Plan repeat physical in 1 year.  - Comprehensive metabolic panel (BMP + Alb, Alk Phos, ALT, AST, Total. Bili, TP)  - CBC with platelets    Menstrual migraine without status migrainosus, not intractable  Discussed risks versus benefits of continued use of OCPs after age 35.  Patient is a non-smoker and has no personal or family history of blood clots.  She notes spotting and incomplete control of headaches with a low-dose OCP, wishes to go back to her previous dose.  Refill provided today.  - levonorgestrel-ethinyl estradiol (NORDETTE) 0.15-30 MG-MCG tablet; Take 1 tablet by mouth daily Taking continuously to have menses every 3 months    LELA (generalized anxiety disorder)  Doing well on current dose of fluoxetine.  Does not desire any changes.  - FLUoxetine (PROZAC) 20 MG capsule; Take 1 capsule (20 mg) by mouth daily  - Comprehensive metabolic panel (BMP + Alb, Alk Phos, ALT, AST, Total. Bili, TP)  - CBC with platelets    Multiple joint pain  Discussed checking some inflammatory markers today, which are normal.  She can discuss with her dermatologist whether this may be related to her psoriasis.  Mostly large joints.  Discussed anti-inflammatory diet, regular low-impact exercise.  - ESR: Erythrocyte sedimentation rate  - CRP inflammation  - Comprehensive metabolic panel (BMP + Alb, Alk Phos, ALT, AST, Total. Bili, TP)  - CBC with platelets    Visit for screening mammogram  After a discussion of screening, patient agrees to have mammogram ordered today.  - MA Screening  "Bilateral w/ Myron; Future    Lipid screening  LDL minimally elevated.  ASCVD risk low.  - Lipid panel reflex to direct LDL Fasting    Screening for deficiency anemia  Screening CBC is normal.  - CBC with platelets            BMI  Estimated body mass index is 30.02 kg/m  as calculated from the following:    Height as of this encounter: 1.64 m (5' 4.57\").    Weight as of this encounter: 80.7 kg (178 lb).   Weight management plan: Discussed healthy diet and exercise guidelines    Counseling  Appropriate preventive services were discussed with this patient, including applicable screening as appropriate for fall prevention, nutrition, physical activity, Tobacco-use cessation, weight loss and cognition.  Checklist reviewing preventive services available has been given to the patient.          Syed Moreno is a 43 year old, presenting for the following:  Physical        6/4/2024    11:19 AM   Additional Questions   Roomed by Makenna AHUJA LPN        Health Care Directive  Patient does not have a Health Care Directive or Living Will: Discussed advance care planning with patient; information given to patient to review.    HPI    Had more migraine headaches on the lower dose OCP.  Would like to go back to a normal dose.  Still feels a bothersome amount of anxiety, irritability.    Feels pain when she tries to exercise, generally doesn't feel well.  Mainly knee pain, but had an injury in the past.  Jaw pain recently.            6/4/2024   General Health   How would you rate your overall physical health? (!) FAIR   Feel stress (tense, anxious, or unable to sleep) Not at all         6/4/2024   Nutrition   Three or more servings of calcium each day? Yes   Diet: Regular (no restrictions)   How many servings of fruit and vegetables per day? 4 or more   How many sweetened beverages each day? 0-1         6/4/2024   Exercise   Days per week of moderate/strenous exercise 3 days         6/4/2024   Social Factors   Frequency of gathering " with friends or relatives Once a week   Worry food won't last until get money to buy more No   Food not last or not have enough money for food? No   Do you have housing?  Yes   Are you worried about losing your housing? No   Lack of transportation? No   Unable to get utilities (heat,electricity)? No         6/4/2024   Dental   Dentist two times every year? Yes         6/4/2024   TB Screening   Were you born outside of the [place]? No       Today's PHQ-9 Score:       6/4/2024    11:01 AM   PHQ-9 SCORE   PHQ-9 Total Score MyChart 3 (Minimal depression)   PHQ-9 Total Score 3         5/10/2022     3:04 PM 6/7/2022    10:07 AM 6/4/2024    11:01 AM   LELA-7 SCORE   Total Score 6 (mild anxiety) 2 (minimal anxiety) 2 (minimal anxiety)   Total Score 6 2 2              6/4/2024   Substance Use   Alcohol more than 3/day or more than 7/wk No   Do you use any other substances recreationally? No     Social History     Tobacco Use    Smoking status: Never    Smokeless tobacco: Never   Vaping Use    Vaping status: Never Used   Substance Use Topics    Alcohol use: Yes     Comment: 0-1/wk    Drug use: No           12/1/2022   LAST FHS-7 RESULTS   1st degree relative breast or ovarian cancer No   Any relative bilateral breast cancer No   Any male have breast cancer No   Any ONE woman have BOTH breast AND ovarian cancer No   Any woman with breast cancer before 50yrs No   2 or more relatives with breast AND/OR ovarian cancer No   2 or more relatives with breast AND/OR bowel cancer No       Mammogram Screening - Mammogram every 1-2 years updated in Health Maintenance based on mutual decision making        6/4/2024   STI Screening   New sexual partner(s) since last STI/HIV test? No     History of abnormal Pap smear: No - age 30- 64 PAP with HPV every 5 years recommended        Latest Ref Rng & Units 12/1/2022     1:59 PM 1/22/2018     6:05 PM 1/22/2018     5:56 PM   PAP / HPV   PAP  Negative for Intraepithelial Lesion or Malignancy (NILM)       PAP (Historical)    NIL    HPV 16 DNA Negative Negative  Negative     HPV 18 DNA Negative Negative  Negative     Other HR HPV Negative Negative  Negative       ASCVD Risk   The 10-year ASCVD risk score (Zhen EDEN, et al., 2019) is: 0.4%    Values used to calculate the score:      Age: 43 years      Sex: Female      Is Non- : No      Diabetic: No      Tobacco smoker: No      Systolic Blood Pressure: 110 mmHg      Is BP treated: No      HDL Cholesterol: 46 mg/dL      Total Cholesterol: 149 mg/dL        2024   Contraception/Family Planning   Questions about contraception or family planning No        Reviewed and updated as needed this visit by Provider                    Past Medical History:   Diagnosis Date    History of shingles     in left ear    psoriasis     S/P  section 2014     Past Surgical History:   Procedure Laterality Date    C/SECTION, LOW TRANSVERSE       , Low Transverse x 2     SECTION N/A 2014    Procedure:  SECTION;  Surgeon: Addis Infante MD;  Location: [facility] L+D    DILATION AND CURETTAGE SUCTION  2013    Procedure: DILATION AND CURETTAGE SUCTION;  Suction Dilation and Curettage;  Surgeon: Addis Infante MD;  Location: [facility] OR    ENT SURGERY  1987    Thyroglossal duct cyst    ZZC  DELIVERY ONLY  2006    ZZC REPAIR CRUCIATE LIGAMENT,KNEE  1999    LEFT     OB History    Para Term  AB Living   4 3 3 0 1 4   SAB IAB Ectopic Multiple Live Births   1 0 0 1 4      # Outcome Date GA Lbr Dwayne/2nd Weight Sex Type Anes PTL Lv   4 Term 14 39w1d  3.43 kg (7 lb 9 oz) M CS-LTranv Spinal  DEBORAH      Apgar1: 9  Apgar5: 10   3 SAB 13 6w0d             Birth Comments: System Generated. Please review and update pregnancy details.   2 Term 08 39w4d  3.459 kg (7 lb 10 oz) M CS-Unspec   DEBORAH      Name: Luke   1A Term 06 38w0d  3.317 kg (7 lb 5 oz) M CS    DEBORAH      Name: Phillip   1B Term 06 38w0d  2.268 kg (5 lb) M CS   DEBORAH      Name: Parker     BP Readings from Last 3 Encounters:   24 110/60   22 114/65   05/10/22 115/64    Wt Readings from Last 3 Encounters:   24 80.7 kg (178 lb)   22 70.4 kg (155 lb 3.2 oz)   05/10/22 82.7 kg (182 lb 6.4 oz)                  Patient Active Problem List   Diagnosis    Psoriasis    History of shingles    Episodic tension-type headache, not intractable    Psychophysiological insomnia    LELA (generalized anxiety disorder)    Menstrual migraine without status migrainosus, not intractable    Multiple joint pain     Past Surgical History:   Procedure Laterality Date    C/SECTION, LOW TRANSVERSE       , Low Transverse x 2     SECTION N/A 2014    Procedure:  SECTION;  Surgeon: Addis Infante MD;  Location: [facility] L[facility]    DILATION AND CURETTAGE SUCTION  2013    Procedure: DILATION AND CURETTAGE SUCTION;  Suction Dilation and Curettage;  Surgeon: Addis Infante MD;  Location: [facility] OR    ENT SURGERY  1987    Thyroglossal duct cyst    ZZC  DELIVERY ONLY  2006    ZZC REPAIR CRUCIATE LIGAMENT,KNEE  1999    LEFT       Social History     Tobacco Use    Smoking status: Never    Smokeless tobacco: Never   Substance Use Topics    Alcohol use: Yes     Comment: 0-1/wk     Family History   Problem Relation Age of Onset    Cancer Mother         non-hodgkins lymphoma since     Colon Cancer Maternal Grandmother     Diabetes Maternal Grandmother     Cancer Maternal Grandmother         colon age 60's    Hypertension Paternal Grandfather     Heart Disease Paternal Grandfather     Breast Cancer No family hx of          Current Outpatient Medications   Medication Sig Dispense Refill    Acetaminophen (TYLENOL PO) Take 500 mg by mouth every 6 hours as needed for mild pain or fever      CLARITIN PO prn       clobetasol (TEMOVATE) 0.05 % external solution Apply  "topically daily as needed (psoriasis) For flares on scalp 50 mL 3    FLUoxetine (PROZAC) 20 MG capsule Take 1 capsule (20 mg) by mouth daily 90 capsule 3    levonorgestrel-ethinyl estradiol (NORDETTE) 0.15-30 MG-MCG tablet Take 1 tablet by mouth daily Taking continuously to have menses every 3 months 112 tablet 3    triamcinolone (KENALOG) 0.1 % external ointment Use twice daily as needed to active areas of psoriasis.  Avoid face, groin, armpits, buttock crease, and skin folds. Profile Rx: patient will contact pharmacy when needed 80 g 3     No Known Allergies      Review of Systems  Constitutional, HEENT, cardiovascular, pulmonary, GI, , musculoskeletal, neuro, skin, endocrine and psych systems are negative, except as otherwise noted.     Objective    Exam  /60   Pulse 60   Temp 98.4  F (36.9  C) (Oral)   Resp 16   Ht 1.64 m (5' 4.57\")   Wt 80.7 kg (178 lb)   LMP 05/12/2024 (Exact Date)   SpO2 99%   BMI 30.02 kg/m     Estimated body mass index is 30.02 kg/m  as calculated from the following:    Height as of this encounter: 1.64 m (5' 4.57\").    Weight as of this encounter: 80.7 kg (178 lb).    Physical Exam  GENERAL: alert and no distress  EYES: Eyes grossly normal to inspection, PERRL and conjunctivae and sclerae normal  HENT: ear canals and TM's normal, nose and mouth without ulcers or lesions  NECK: no adenopathy, no asymmetry, masses, or scars  RESP: lungs clear to auscultation - no rales, rhonchi or wheezes  BREAST: normal without masses, tenderness or nipple discharge and no palpable axillary masses or adenopathy  CV: regular rate and rhythm, normal S1 S2, no S3 or S4, no murmur, click or rub, no peripheral edema  ABDOMEN: soft, nontender, no hepatosplenomegaly, no masses and bowel sounds normal  MS: no gross musculoskeletal defects noted, no edema  SKIN: no suspicious lesions or rashes  NEURO: Normal strength and tone, mentation intact and speech normal  PSYCH: mentation appears normal, " affect normal/bright        Signed Electronically by: Nydia Sadler MD

## 2024-06-05 LAB
ALBUMIN SERPL BCG-MCNC: 4.3 G/DL (ref 3.5–5.2)
ALP SERPL-CCNC: 82 U/L (ref 40–150)
ALT SERPL W P-5'-P-CCNC: 25 U/L (ref 0–50)
ANION GAP SERPL CALCULATED.3IONS-SCNC: 9 MMOL/L (ref 7–15)
AST SERPL W P-5'-P-CCNC: 26 U/L (ref 0–45)
BILIRUB SERPL-MCNC: 0.8 MG/DL
BUN SERPL-MCNC: 13 MG/DL (ref 6–20)
CALCIUM SERPL-MCNC: 9.1 MG/DL (ref 8.6–10)
CHLORIDE SERPL-SCNC: 106 MMOL/L (ref 98–107)
CHOLEST SERPL-MCNC: 161 MG/DL
CREAT SERPL-MCNC: 0.85 MG/DL (ref 0.51–0.95)
CRP SERPL-MCNC: 3.59 MG/L
DEPRECATED HCO3 PLAS-SCNC: 27 MMOL/L (ref 22–29)
EGFRCR SERPLBLD CKD-EPI 2021: 87 ML/MIN/1.73M2
FASTING STATUS PATIENT QL REPORTED: YES
FASTING STATUS PATIENT QL REPORTED: YES
GLUCOSE SERPL-MCNC: 87 MG/DL (ref 70–99)
HDLC SERPL-MCNC: 49 MG/DL
LDLC SERPL CALC-MCNC: 104 MG/DL
NONHDLC SERPL-MCNC: 112 MG/DL
POTASSIUM SERPL-SCNC: 4.8 MMOL/L (ref 3.4–5.3)
PROT SERPL-MCNC: 6.6 G/DL (ref 6.4–8.3)
SODIUM SERPL-SCNC: 142 MMOL/L (ref 135–145)
TRIGL SERPL-MCNC: 39 MG/DL

## 2024-06-16 PROBLEM — M79.661 BILATERAL CALF PAIN: Status: RESOLVED | Noted: 2022-05-15 | Resolved: 2024-06-16

## 2024-06-16 PROBLEM — M25.50 MULTIPLE JOINT PAIN: Status: ACTIVE | Noted: 2024-06-16

## 2024-06-16 PROBLEM — M79.662 BILATERAL CALF PAIN: Status: RESOLVED | Noted: 2022-05-15 | Resolved: 2024-06-16

## 2024-06-16 PROBLEM — G43.829 MENSTRUAL MIGRAINE WITHOUT STATUS MIGRAINOSUS, NOT INTRACTABLE: Status: ACTIVE | Noted: 2024-06-16

## 2024-12-26 ENCOUNTER — PATIENT OUTREACH (OUTPATIENT)
Dept: CARE COORDINATION | Facility: CLINIC | Age: 44
End: 2024-12-26
Payer: COMMERCIAL

## 2025-05-01 ENCOUNTER — MYC REFILL (OUTPATIENT)
Dept: FAMILY MEDICINE | Facility: CLINIC | Age: 45
End: 2025-05-01
Payer: COMMERCIAL

## 2025-05-01 DIAGNOSIS — L40.9 PSORIASIS: ICD-10-CM

## 2025-05-01 RX ORDER — CLOBETASOL PROPIONATE 0.5 MG/ML
SOLUTION TOPICAL DAILY PRN
Qty: 50 ML | Refills: 0 | Status: SHIPPED | OUTPATIENT
Start: 2025-05-01

## 2025-05-05 ENCOUNTER — PATIENT OUTREACH (OUTPATIENT)
Dept: CARE COORDINATION | Facility: CLINIC | Age: 45
End: 2025-05-05
Payer: COMMERCIAL

## 2025-05-19 ENCOUNTER — PATIENT OUTREACH (OUTPATIENT)
Dept: CARE COORDINATION | Facility: CLINIC | Age: 45
End: 2025-05-19
Payer: COMMERCIAL

## 2025-08-20 DIAGNOSIS — F41.1 GAD (GENERALIZED ANXIETY DISORDER): ICD-10-CM
